# Patient Record
Sex: FEMALE | Race: WHITE | NOT HISPANIC OR LATINO | Employment: OTHER | URBAN - METROPOLITAN AREA
[De-identification: names, ages, dates, MRNs, and addresses within clinical notes are randomized per-mention and may not be internally consistent; named-entity substitution may affect disease eponyms.]

---

## 2017-04-14 ENCOUNTER — OFFICE VISIT - HEALTHEAST (OUTPATIENT)
Dept: FAMILY MEDICINE | Facility: CLINIC | Age: 62
End: 2017-04-14

## 2017-04-14 DIAGNOSIS — R82.90 CLOUDY URINE: ICD-10-CM

## 2017-04-14 DIAGNOSIS — M54.2 NECK PAIN: ICD-10-CM

## 2017-04-14 RX ORDER — IBUPROFEN 200 MG
800 TABLET ORAL 2 TIMES DAILY
Status: SHIPPED | COMMUNITY
Start: 2017-04-14 | End: 2023-04-18

## 2017-04-14 ASSESSMENT — MIFFLIN-ST. JEOR: SCORE: 1062.3

## 2017-04-15 ENCOUNTER — COMMUNICATION - HEALTHEAST (OUTPATIENT)
Dept: FAMILY MEDICINE | Facility: CLINIC | Age: 62
End: 2017-04-15

## 2017-07-25 ENCOUNTER — OFFICE VISIT - HEALTHEAST (OUTPATIENT)
Dept: FAMILY MEDICINE | Facility: CLINIC | Age: 62
End: 2017-07-25

## 2017-07-25 ENCOUNTER — COMMUNICATION - HEALTHEAST (OUTPATIENT)
Dept: SCHEDULING | Facility: CLINIC | Age: 62
End: 2017-07-25

## 2017-07-25 DIAGNOSIS — M54.50 LOW BACK PAIN: ICD-10-CM

## 2017-07-25 ASSESSMENT — MIFFLIN-ST. JEOR: SCORE: 1061.85

## 2017-09-25 ENCOUNTER — COMMUNICATION - HEALTHEAST (OUTPATIENT)
Dept: FAMILY MEDICINE | Facility: CLINIC | Age: 62
End: 2017-09-25

## 2018-03-01 ENCOUNTER — OFFICE VISIT - HEALTHEAST (OUTPATIENT)
Dept: FAMILY MEDICINE | Facility: CLINIC | Age: 63
End: 2018-03-01

## 2018-03-01 DIAGNOSIS — M54.50 LOW BACK PAIN: ICD-10-CM

## 2018-03-01 DIAGNOSIS — H93.19 TINNITUS: ICD-10-CM

## 2018-03-01 DIAGNOSIS — R68.84 JAW PAIN: ICD-10-CM

## 2018-03-01 ASSESSMENT — MIFFLIN-ST. JEOR: SCORE: 1084.98

## 2018-03-02 ENCOUNTER — RECORDS - HEALTHEAST (OUTPATIENT)
Dept: ADMINISTRATIVE | Facility: OTHER | Age: 63
End: 2018-03-02

## 2018-04-13 ENCOUNTER — OFFICE VISIT - HEALTHEAST (OUTPATIENT)
Dept: AUDIOLOGY | Facility: CLINIC | Age: 63
End: 2018-04-13

## 2018-04-13 ENCOUNTER — OFFICE VISIT - HEALTHEAST (OUTPATIENT)
Dept: OTOLARYNGOLOGY | Facility: CLINIC | Age: 63
End: 2018-04-13

## 2018-04-13 DIAGNOSIS — R42 DIZZINESS AND GIDDINESS: ICD-10-CM

## 2018-04-13 DIAGNOSIS — H93.13 TINNITUS OF BOTH EARS: ICD-10-CM

## 2018-04-13 RX ORDER — TRETINOIN 0.25 MG/G
GEL TOPICAL
Refills: 2 | Status: SHIPPED | COMMUNITY
Start: 2018-03-14

## 2018-04-13 RX ORDER — TRAMADOL HYDROCHLORIDE 50 MG/1
TABLET ORAL
Refills: 1 | Status: SHIPPED | COMMUNITY
Start: 2018-03-13

## 2018-04-13 RX ORDER — TIZANIDINE 2 MG/1
2 TABLET ORAL 2 TIMES DAILY
Refills: 1 | Status: SHIPPED | COMMUNITY
Start: 2018-04-05

## 2018-06-07 ENCOUNTER — OFFICE VISIT - HEALTHEAST (OUTPATIENT)
Dept: FAMILY MEDICINE | Facility: CLINIC | Age: 63
End: 2018-06-07

## 2018-06-07 DIAGNOSIS — M54.50 LOW BACK PAIN: ICD-10-CM

## 2018-06-07 DIAGNOSIS — I10 HYPERTENSION: ICD-10-CM

## 2018-06-07 LAB
ALBUMIN UR-MCNC: NEGATIVE MG/DL
ANION GAP SERPL CALCULATED.3IONS-SCNC: 11 MMOL/L (ref 5–18)
APPEARANCE UR: CLEAR
ATRIAL RATE - MUSE: 63 BPM
BILIRUB UR QL STRIP: NEGATIVE
BUN SERPL-MCNC: 16 MG/DL (ref 8–22)
CALCIUM SERPL-MCNC: 10.1 MG/DL (ref 8.5–10.5)
CHLORIDE BLD-SCNC: 103 MMOL/L (ref 98–107)
CO2 SERPL-SCNC: 28 MMOL/L (ref 22–31)
COLOR UR AUTO: YELLOW
CREAT SERPL-MCNC: 0.99 MG/DL (ref 0.6–1.1)
DIASTOLIC BLOOD PRESSURE - MUSE: NORMAL MMHG
ERYTHROCYTE [DISTWIDTH] IN BLOOD BY AUTOMATED COUNT: 11 % (ref 11–14.5)
GFR SERPL CREATININE-BSD FRML MDRD: 57 ML/MIN/1.73M2
GLUCOSE BLD-MCNC: 86 MG/DL (ref 70–125)
GLUCOSE UR STRIP-MCNC: NEGATIVE MG/DL
HCT VFR BLD AUTO: 41.8 % (ref 35–47)
HGB BLD-MCNC: 14 G/DL (ref 12–16)
HGB UR QL STRIP: NEGATIVE
INTERPRETATION ECG - MUSE: NORMAL
KETONES UR STRIP-MCNC: NEGATIVE MG/DL
LEUKOCYTE ESTERASE UR QL STRIP: NEGATIVE
MCH RBC QN AUTO: 32.2 PG (ref 27–34)
MCHC RBC AUTO-ENTMCNC: 33.5 G/DL (ref 32–36)
MCV RBC AUTO: 96 FL (ref 80–100)
NITRATE UR QL: NEGATIVE
P AXIS - MUSE: 59 DEGREES
PH UR STRIP: 7 [PH] (ref 5–8)
PLATELET # BLD AUTO: 202 THOU/UL (ref 140–440)
PMV BLD AUTO: 7.6 FL (ref 7–10)
POTASSIUM BLD-SCNC: 4.6 MMOL/L (ref 3.5–5)
PR INTERVAL - MUSE: 162 MS
QRS DURATION - MUSE: 86 MS
QT - MUSE: 408 MS
QTC - MUSE: 417 MS
R AXIS - MUSE: 14 DEGREES
RBC # BLD AUTO: 4.36 MILL/UL (ref 3.8–5.4)
SODIUM SERPL-SCNC: 142 MMOL/L (ref 136–145)
SP GR UR STRIP: 1.01 (ref 1–1.03)
SYSTOLIC BLOOD PRESSURE - MUSE: NORMAL MMHG
T AXIS - MUSE: 64 DEGREES
UROBILINOGEN UR STRIP-ACNC: NORMAL
VENTRICULAR RATE- MUSE: 63 BPM
WBC: 5.9 THOU/UL (ref 4–11)

## 2018-06-07 ASSESSMENT — MIFFLIN-ST. JEOR: SCORE: 1071.38

## 2018-07-05 ENCOUNTER — COMMUNICATION - HEALTHEAST (OUTPATIENT)
Dept: SCHEDULING | Facility: CLINIC | Age: 63
End: 2018-07-05

## 2018-07-31 ENCOUNTER — RECORDS - HEALTHEAST (OUTPATIENT)
Dept: ADMINISTRATIVE | Facility: OTHER | Age: 63
End: 2018-07-31

## 2018-08-06 ENCOUNTER — COMMUNICATION - HEALTHEAST (OUTPATIENT)
Dept: FAMILY MEDICINE | Facility: CLINIC | Age: 63
End: 2018-08-06

## 2018-08-07 ENCOUNTER — COMMUNICATION - HEALTHEAST (OUTPATIENT)
Dept: FAMILY MEDICINE | Facility: CLINIC | Age: 63
End: 2018-08-07

## 2018-08-07 DIAGNOSIS — I10 HTN (HYPERTENSION): ICD-10-CM

## 2018-10-16 ENCOUNTER — RECORDS - HEALTHEAST (OUTPATIENT)
Dept: ADMINISTRATIVE | Facility: OTHER | Age: 63
End: 2018-10-16

## 2018-11-28 ENCOUNTER — COMMUNICATION - HEALTHEAST (OUTPATIENT)
Dept: FAMILY MEDICINE | Facility: CLINIC | Age: 63
End: 2018-11-28

## 2018-11-28 DIAGNOSIS — Z12.31 VISIT FOR SCREENING MAMMOGRAM: ICD-10-CM

## 2018-11-28 DIAGNOSIS — Z12.11 COLON CANCER SCREENING: ICD-10-CM

## 2018-12-06 ENCOUNTER — RECORDS - HEALTHEAST (OUTPATIENT)
Dept: ADMINISTRATIVE | Facility: OTHER | Age: 63
End: 2018-12-06

## 2018-12-07 ENCOUNTER — OFFICE VISIT - HEALTHEAST (OUTPATIENT)
Dept: FAMILY MEDICINE | Facility: CLINIC | Age: 63
End: 2018-12-07

## 2018-12-07 ENCOUNTER — AMBULATORY - HEALTHEAST (OUTPATIENT)
Dept: SCHEDULING | Facility: CLINIC | Age: 63
End: 2018-12-07

## 2018-12-07 DIAGNOSIS — Z78.0 POST-MENOPAUSAL: ICD-10-CM

## 2018-12-07 DIAGNOSIS — M54.5 LOW BACK PAIN, UNSPECIFIED BACK PAIN LATERALITY, UNSPECIFIED CHRONICITY, WITH SCIATICA PRESENCE UNSPECIFIED: ICD-10-CM

## 2019-01-11 ENCOUNTER — RECORDS - HEALTHEAST (OUTPATIENT)
Dept: ADMINISTRATIVE | Facility: OTHER | Age: 64
End: 2019-01-11

## 2019-01-17 ASSESSMENT — MIFFLIN-ST. JEOR: SCORE: 1112.2

## 2019-01-21 ENCOUNTER — SURGERY - HEALTHEAST (OUTPATIENT)
Dept: SURGERY | Facility: AMBULATORY SURGERY CENTER | Age: 64
End: 2019-01-21

## 2019-02-11 ASSESSMENT — MIFFLIN-ST. JEOR: SCORE: 1094.06

## 2019-02-14 ENCOUNTER — SURGERY - HEALTHEAST (OUTPATIENT)
Dept: SURGERY | Facility: AMBULATORY SURGERY CENTER | Age: 64
End: 2019-02-14

## 2019-02-14 ASSESSMENT — MIFFLIN-ST. JEOR: SCORE: 1094.06

## 2019-06-12 ENCOUNTER — RECORDS - HEALTHEAST (OUTPATIENT)
Dept: ADMINISTRATIVE | Facility: OTHER | Age: 64
End: 2019-06-12

## 2019-08-08 ENCOUNTER — RECORDS - HEALTHEAST (OUTPATIENT)
Dept: ADMINISTRATIVE | Facility: OTHER | Age: 64
End: 2019-08-08

## 2019-09-10 ENCOUNTER — COMMUNICATION - HEALTHEAST (OUTPATIENT)
Dept: FAMILY MEDICINE | Facility: CLINIC | Age: 64
End: 2019-09-10

## 2019-11-19 ENCOUNTER — OFFICE VISIT - HEALTHEAST (OUTPATIENT)
Dept: FAMILY MEDICINE | Facility: CLINIC | Age: 64
End: 2019-11-19

## 2019-11-19 DIAGNOSIS — Z85.3 PERSONAL HISTORY OF MALIGNANT NEOPLASM OF BREAST: ICD-10-CM

## 2019-11-19 DIAGNOSIS — M54.50 CHRONIC LOW BACK PAIN, UNSPECIFIED BACK PAIN LATERALITY, UNSPECIFIED WHETHER SCIATICA PRESENT: ICD-10-CM

## 2019-11-19 DIAGNOSIS — Z00.00 ROUTINE GENERAL MEDICAL EXAMINATION AT A HEALTH CARE FACILITY: ICD-10-CM

## 2019-11-19 DIAGNOSIS — G89.29 CHRONIC LOW BACK PAIN, UNSPECIFIED BACK PAIN LATERALITY, UNSPECIFIED WHETHER SCIATICA PRESENT: ICD-10-CM

## 2019-11-19 DIAGNOSIS — M81.0 AGE-RELATED OSTEOPOROSIS WITHOUT CURRENT PATHOLOGICAL FRACTURE: ICD-10-CM

## 2019-11-19 DIAGNOSIS — I10 ESSENTIAL HYPERTENSION: ICD-10-CM

## 2019-11-19 DIAGNOSIS — M25.50 MULTIPLE JOINT PAIN: ICD-10-CM

## 2019-11-19 ASSESSMENT — MIFFLIN-ST. JEOR: SCORE: 1081.59

## 2019-11-20 LAB
HPV SOURCE: NORMAL
HUMAN PAPILLOMA VIRUS 16 DNA: NEGATIVE
HUMAN PAPILLOMA VIRUS 18 DNA: NEGATIVE
HUMAN PAPILLOMA VIRUS FINAL DIAGNOSIS: NORMAL
HUMAN PAPILLOMA VIRUS OTHER HR: NEGATIVE
SPECIMEN DESCRIPTION: NORMAL

## 2019-11-26 ENCOUNTER — AMBULATORY - HEALTHEAST (OUTPATIENT)
Dept: LAB | Facility: CLINIC | Age: 64
End: 2019-11-26

## 2019-11-26 DIAGNOSIS — Z00.00 ROUTINE GENERAL MEDICAL EXAMINATION AT A HEALTH CARE FACILITY: ICD-10-CM

## 2019-11-26 DIAGNOSIS — M25.50 MULTIPLE JOINT PAIN: ICD-10-CM

## 2019-11-26 DIAGNOSIS — M81.0 AGE-RELATED OSTEOPOROSIS WITHOUT CURRENT PATHOLOGICAL FRACTURE: ICD-10-CM

## 2019-11-26 DIAGNOSIS — I10 ESSENTIAL HYPERTENSION: ICD-10-CM

## 2019-11-26 LAB
ALBUMIN SERPL-MCNC: 4 G/DL (ref 3.5–5)
ALP SERPL-CCNC: 94 U/L (ref 45–120)
ALT SERPL W P-5'-P-CCNC: 22 U/L (ref 0–45)
ANION GAP SERPL CALCULATED.3IONS-SCNC: 12 MMOL/L (ref 5–18)
AST SERPL W P-5'-P-CCNC: 21 U/L (ref 0–40)
BILIRUB DIRECT SERPL-MCNC: 0.2 MG/DL
BILIRUB SERPL-MCNC: 0.6 MG/DL (ref 0–1)
BUN SERPL-MCNC: 12 MG/DL (ref 8–22)
C REACTIVE PROTEIN LHE: <0.1 MG/DL (ref 0–0.8)
CALCIUM SERPL-MCNC: 9.7 MG/DL (ref 8.5–10.5)
CHLORIDE BLD-SCNC: 102 MMOL/L (ref 98–107)
CHOLEST SERPL-MCNC: 229 MG/DL
CO2 SERPL-SCNC: 28 MMOL/L (ref 22–31)
CREAT SERPL-MCNC: 1.01 MG/DL (ref 0.6–1.1)
ERYTHROCYTE [DISTWIDTH] IN BLOOD BY AUTOMATED COUNT: 10.9 % (ref 11–14.5)
ERYTHROCYTE [SEDIMENTATION RATE] IN BLOOD BY WESTERGREN METHOD: 2 MM/HR (ref 0–20)
FASTING STATUS PATIENT QL REPORTED: YES
GFR SERPL CREATININE-BSD FRML MDRD: 56 ML/MIN/1.73M2
GLUCOSE BLD-MCNC: 74 MG/DL (ref 70–125)
HCT VFR BLD AUTO: 46.4 % (ref 35–47)
HDLC SERPL-MCNC: 72 MG/DL
HGB BLD-MCNC: 15.5 G/DL (ref 12–16)
LDLC SERPL CALC-MCNC: 120 MG/DL
MCH RBC QN AUTO: 33.2 PG (ref 27–34)
MCHC RBC AUTO-ENTMCNC: 33.4 G/DL (ref 32–36)
MCV RBC AUTO: 99 FL (ref 80–100)
PLATELET # BLD AUTO: 245 THOU/UL (ref 140–440)
PMV BLD AUTO: 8.5 FL (ref 7–10)
POTASSIUM BLD-SCNC: 4.1 MMOL/L (ref 3.5–5)
PROT SERPL-MCNC: 6.1 G/DL (ref 6–8)
RBC # BLD AUTO: 4.67 MILL/UL (ref 3.8–5.4)
RHEUMATOID FACT SERPL-ACNC: <15 IU/ML (ref 0–30)
SODIUM SERPL-SCNC: 142 MMOL/L (ref 136–145)
TRIGL SERPL-MCNC: 183 MG/DL
URATE SERPL-MCNC: 6.4 MG/DL (ref 2–7.5)
WBC: 5.2 THOU/UL (ref 4–11)

## 2019-11-27 LAB
25(OH)D3 SERPL-MCNC: 50.6 NG/ML (ref 30–80)
25(OH)D3 SERPL-MCNC: 50.6 NG/ML (ref 30–80)
BKR LAB AP ABNORMAL BLEEDING: NO
BKR LAB AP BIRTH CONTROL/HORMONES: NORMAL
BKR LAB AP CERVICAL APPEARANCE: NORMAL
BKR LAB AP GYN ADEQUACY: NORMAL
BKR LAB AP GYN INTERPRETATION: NORMAL
BKR LAB AP HPV REFLEX: NORMAL
BKR LAB AP LMP: NORMAL
BKR LAB AP PATIENT STATUS: NORMAL
BKR LAB AP PREVIOUS ABNORMAL: NO
BKR LAB AP PREVIOUS NORMAL: 2016
HIGH RISK?: NO
PATH REPORT.COMMENTS IMP SPEC: NORMAL
RESULT FLAG (HE HISTORICAL CONVERSION): NORMAL

## 2019-11-29 LAB — ANA SER QL: 0 U

## 2020-08-17 ENCOUNTER — COMMUNICATION - HEALTHEAST (OUTPATIENT)
Dept: FAMILY MEDICINE | Facility: CLINIC | Age: 65
End: 2020-08-17

## 2020-08-17 DIAGNOSIS — R19.7 DIARRHEA, UNSPECIFIED TYPE: ICD-10-CM

## 2020-08-21 ENCOUNTER — RECORDS - HEALTHEAST (OUTPATIENT)
Dept: ADMINISTRATIVE | Facility: OTHER | Age: 65
End: 2020-08-21

## 2020-11-18 ENCOUNTER — AMBULATORY - HEALTHEAST (OUTPATIENT)
Dept: SURGERY | Facility: AMBULATORY SURGERY CENTER | Age: 65
End: 2020-11-18

## 2020-11-18 ENCOUNTER — AMBULATORY - HEALTHEAST (OUTPATIENT)
Dept: SCHEDULING | Facility: CLINIC | Age: 65
End: 2020-11-18

## 2020-11-18 DIAGNOSIS — Z11.59 ENCOUNTER FOR SCREENING FOR OTHER VIRAL DISEASES: ICD-10-CM

## 2020-11-19 ENCOUNTER — RECORDS - HEALTHEAST (OUTPATIENT)
Dept: ADMINISTRATIVE | Facility: OTHER | Age: 65
End: 2020-11-19

## 2020-12-06 ENCOUNTER — AMBULATORY - HEALTHEAST (OUTPATIENT)
Dept: FAMILY MEDICINE | Facility: CLINIC | Age: 65
End: 2020-12-06

## 2020-12-06 DIAGNOSIS — Z11.59 ENCOUNTER FOR SCREENING FOR OTHER VIRAL DISEASES: ICD-10-CM

## 2020-12-07 LAB
SARS-COV-2 PCR COMMENT: NORMAL
SARS-COV-2 RNA SPEC QL NAA+PROBE: NEGATIVE
SARS-COV-2 VIRUS SPECIMEN SOURCE: NORMAL

## 2020-12-08 ENCOUNTER — COMMUNICATION - HEALTHEAST (OUTPATIENT)
Dept: SCHEDULING | Facility: CLINIC | Age: 65
End: 2020-12-08

## 2020-12-09 ASSESSMENT — MIFFLIN-ST. JEOR: SCORE: 1040.77

## 2020-12-10 ENCOUNTER — SURGERY - HEALTHEAST (OUTPATIENT)
Dept: SURGERY | Facility: AMBULATORY SURGERY CENTER | Age: 65
End: 2020-12-10

## 2020-12-10 ASSESSMENT — MIFFLIN-ST. JEOR: SCORE: 1048.7

## 2021-02-11 ENCOUNTER — RECORDS - HEALTHEAST (OUTPATIENT)
Dept: ADMINISTRATIVE | Facility: OTHER | Age: 66
End: 2021-02-11

## 2021-04-12 ENCOUNTER — IMMUNIZATION (OUTPATIENT)
Dept: NURSING | Facility: CLINIC | Age: 66
End: 2021-04-12
Payer: COMMERCIAL

## 2021-04-12 PROCEDURE — 91300 PR COVID VAC PFIZER DIL RECON 30 MCG/0.3 ML IM: CPT

## 2021-04-12 PROCEDURE — 0001A PR COVID VAC PFIZER DIL RECON 30 MCG/0.3 ML IM: CPT

## 2021-05-03 ENCOUNTER — IMMUNIZATION (OUTPATIENT)
Dept: NURSING | Facility: CLINIC | Age: 66
End: 2021-05-03
Attending: FAMILY MEDICINE
Payer: COMMERCIAL

## 2021-05-03 PROCEDURE — 0002A PR COVID VAC PFIZER DIL RECON 30 MCG/0.3 ML IM: CPT

## 2021-05-03 PROCEDURE — 91300 PR COVID VAC PFIZER DIL RECON 30 MCG/0.3 ML IM: CPT

## 2021-05-05 ENCOUNTER — RECORDS - HEALTHEAST (OUTPATIENT)
Dept: ADMINISTRATIVE | Facility: OTHER | Age: 66
End: 2021-05-05

## 2021-05-30 ENCOUNTER — HEALTH MAINTENANCE LETTER (OUTPATIENT)
Age: 66
End: 2021-05-30

## 2021-05-30 VITALS — HEIGHT: 64 IN | WEIGHT: 118 LBS | BODY MASS INDEX: 20.14 KG/M2

## 2021-05-31 VITALS — BODY MASS INDEX: 20.13 KG/M2 | HEIGHT: 64 IN | WEIGHT: 117.9 LBS

## 2021-06-01 VITALS — WEIGHT: 123 LBS | HEIGHT: 64 IN | BODY MASS INDEX: 21 KG/M2

## 2021-06-01 VITALS — BODY MASS INDEX: 20.49 KG/M2 | HEIGHT: 64 IN | WEIGHT: 120 LBS

## 2021-06-02 VITALS — WEIGHT: 129 LBS | BODY MASS INDEX: 22.49 KG/M2

## 2021-06-02 VITALS — WEIGHT: 129 LBS | HEIGHT: 64 IN | BODY MASS INDEX: 22.02 KG/M2

## 2021-06-02 VITALS — BODY MASS INDEX: 21.34 KG/M2 | HEIGHT: 64 IN | WEIGHT: 125 LBS

## 2021-06-03 NOTE — PATIENT INSTRUCTIONS - HE
Remain physically active.  Weightbearing exercise such as light weights or resistance bands can be helpful for your bone density  Recommend that you consider seeing an endocrinologist to discuss options for your bone density  Remember adequate calcium 1200 mg plus vitamin D 1000 units  Your Pap test was done today  You may make a lab only appointment  Follow-up with rheumatology

## 2021-06-03 NOTE — PROGRESS NOTES
Assessment/ Plan     1. Routine general medical examination at a health care facility    Recommend remaining physically active as tolerated  Recommend adequate calcium and vitamin D supplementation  Check a lipid cascade  ThinPrep Pap test will be sent  - Lipid Cascade; Future  - Gynecologic Cytology (PAP Smear)  - HPV High Risk DNA Cervical    Her colonoscopy is up-to-date from April 2011.  She is due in April 2021  Influenza vaccine was given in October recommend that she consider the tetanus booster      2. Personal history of malignant neoplasm of breast  Status post left breast mastectomy    Release of information was obtained.  We will obtain her most recent mammogram which was reportedly normal from October, 2019    3. Essential hypertension  Inadequate control    Reviewed dietary and lifestyle modification  Patient preference is to recheck her blood pressure  Strongly recommend an antihypertensive medication if her blood pressure remains elevated  - Basic Metabolic Panel; Future    4. Chronic low back pain, unspecified back pain laterality, unspecified whether sciatica present    Continue to follow plan of Dr. Sipple  She follows up every 3 months  She has been treated with tizanidine and tramadol    5. Age-related osteoporosis without current pathological fracture    Recommend adequate calcium and vitamin D  Recommend weightbearing exercise  Recommend follow-up with endocrinology to review treatment options  - Hepatic Profile; Future  - HM2(CBC w/o Differential); Future  - Vitamin D, Total (25-Hydroxy); Future    6. Multiple joint pain  May be stated osteoarthritis    Check laboratory testing as noted  Patient will follow-up with rheumatology per her request  - Rheumatoid Factor Quant; Future  - Antinuclear Antibody (SIENA) Cascade; Future  - Erythrocyte Sedimentation Rate; Future  - Uric Acid; Future  - Ambulatory referral to Rheumatology  - C-Reactive Protein (CRP); Future      Subjective:       Jenifer LOPEZ  Aelx is a 64 y.o. female who presents to the clinic for complete physical examination.  Her medical history is notable for hypertension, breast cancer, chronic low back pain, and osteoporosis.    Her medical history is notable for breast cancer diagnosed in 2007.  She underwent a left breast mastectomy.  Her most recent mammogram was in October of this year.    Additionally, she has a history of chronic neck and low back pain followed by Dr. Sipple.  She has known low back pain and multilevel lumbar degenerative changes.  There is evidence of spondylolisthesis and advanced facet degeneration moderate disc degeneration.  She has paracentral disc protrusions noted including L4-L5.  She has been treated with gabapentin and tizanidine as well as tramadol.  She continues to follow-up with Dr. Sipple every 3 months.    Her most recent colonoscopy was in April 2011.  She is due in 2021.    Review of systems is notable for pain in multiple joints.  She has had pain in joints of her hands.  She has had neck and back pain as noted.  She denies prolonged early morning stiffness.  She has not had obvious swelling in her joints.    Also, she has a history of osteoporosis.  She has previously been treated with alendronate as well as Forteo.  She is taking calcium and vitamin D currently.    The following portions of the patient's history were reviewed and updated as appropriate: allergies, current medications, past family history, past medical history, past social history, past surgical history and problem list. Medications have been reconciled    Review of Systems   A 12 point comprehensive review of systems was negative except as noted.      Current Outpatient Medications   Medication Sig Dispense Refill     BIOTIN ORAL Take by mouth daily.       calcium carb and citrate-vitD3 600 mg calcium- 500 unit TbER Take by mouth daily.       cholecalciferol, vitamin D3, 1,000 unit tablet Take 1,000 Units by mouth daily.       COQ10,  "UBIQUINOL, ORAL Take by mouth.       ibuprofen (ADVIL,MOTRIN) 200 MG tablet Take 800 mg by mouth 2 (two) times a day.       multivitamin (ONE A DAY) per tablet Take 1 tablet by mouth daily.       NON FORMULARY CARDITONE  1 and half capsules daily .       tiZANidine (ZANAFLEX) 2 MG tablet Take 2 mg by mouth 2 (two) times a day.  1     TIZANIDINE HCL (TIZANIDINE ORAL) as needed.       traMADol (ULTRAM) 50 mg tablet TK 1 TO 2 TS PO Q 4 H PRN  1     tretinoin (RETIN-A) 0.025 % gel YENI TO FACE QHS  2     No current facility-administered medications for this visit.        Objective:      /72   Pulse 64   Temp 98.6  F (37  C) (Oral)   Resp 12   Ht 5' 3\" (1.6 m)   Wt 124 lb (56.2 kg)   BMI 21.97 kg/m        General appearance: alert, appears stated age and cooperative  Head: Normocephalic, without obvious abnormality, atraumatic  Eyes: conjunctivae/corneas clear. PERRL, EOM's intact.   Ears: normal TM's and external ear canals both ears  Nose: Nares normal. Septum midline. Mucosa normal. No drainage or sinus tenderness.  Throat: lips, mucosa, and tongue normal; teeth and gums normal  Neck: no adenopathy, supple, symmetrical, trachea midline and thyroid not enlarged, symmetric, no tenderness/mass/nodules  Back: symmetric, no curvature. ROM normal. No CVA tenderness.  Lungs: clear to auscultation bilaterally  Heart: regular rate and rhythm, S1, S2 normal, no murmur, click, rub or gallop  Abdomen: soft, non-tender; bowel sounds normal; no masses,  no organomegaly  Normally developed genitalia with no external lesions or eruptions. Vagina and cervix show no lesions, inflammation, discharge or tenderness.   Uterus normal to palpation.  No adnexal mass or tenderness.  Extremities: extremities normal, atraumatic, no cyanosis or edema  Skin: Skin color, texture, turgor normal  Lymph nodes: Cervical nodes normal.  Neurologic: Alert and oriented X 3         Recent Results (from the past 168 hour(s))   Basic Metabolic " Panel   Result Value Ref Range    Sodium 142 136 - 145 mmol/L    Potassium 4.1 3.5 - 5.0 mmol/L    Chloride 102 98 - 107 mmol/L    CO2 28 22 - 31 mmol/L    Anion Gap, Calculation 12 5 - 18 mmol/L    Glucose 74 70 - 125 mg/dL    Calcium 9.7 8.5 - 10.5 mg/dL    BUN 12 8 - 22 mg/dL    Creatinine 1.01 0.60 - 1.10 mg/dL    GFR MDRD Af Amer >60 >60 mL/min/1.73m2    GFR MDRD Non Af Amer 56 (L) >60 mL/min/1.73m2   Hepatic Profile   Result Value Ref Range    Bilirubin, Total 0.6 0.0 - 1.0 mg/dL    Bilirubin, Direct 0.2 <=0.5 mg/dL    Protein, Total 6.1 6.0 - 8.0 g/dL    Albumin 4.0 3.5 - 5.0 g/dL    Alkaline Phosphatase 94 45 - 120 U/L    AST 21 0 - 40 U/L    ALT 22 0 - 45 U/L   HM2(CBC w/o Differential)   Result Value Ref Range    WBC 5.2 4.0 - 11.0 thou/uL    RBC 4.67 3.80 - 5.40 mill/uL    Hemoglobin 15.5 12.0 - 16.0 g/dL    Hematocrit 46.4 35.0 - 47.0 %    MCV 99 80 - 100 fL    MCH 33.2 27.0 - 34.0 pg    MCHC 33.4 32.0 - 36.0 g/dL    RDW 10.9 (L) 11.0 - 14.5 %    Platelets 245 140 - 440 thou/uL    MPV 8.5 7.0 - 10.0 fL   Lipid Cascade   Result Value Ref Range    Cholesterol 229 (H) <=199 mg/dL    Triglycerides 183 (H) <=149 mg/dL    HDL Cholesterol 72 >=50 mg/dL    LDL Calculated 120 <=129 mg/dL    Patient Fasting > 8hrs? Yes    Vitamin D, Total (25-Hydroxy)   Result Value Ref Range    Vitamin D, Total (25-Hydroxy) 50.6 30.0 - 80.0 ng/mL   Rheumatoid Factor Quant   Result Value Ref Range    Rheumatoid Factor Quantitative <15.0 0 - 30 IU/mL   Antinuclear Antibody (SIENA) Cascade   Result Value Ref Range    SIENA Screen Cascade 0.0 <=2.9 U   Erythrocyte Sedimentation Rate   Result Value Ref Range    Sed Rate 2 0 - 20 mm/hr   Uric Acid   Result Value Ref Range    Uric Acid 6.4 2.0 - 7.5 mg/dL   C-Reactive Protein (CRP)   Result Value Ref Range    CRP <0.1 0.0 - 0.8 mg/dL          This note has been dictated using voice recognition software. Any grammatical or context distortions are unintentional and inherent to the software

## 2021-06-04 VITALS
TEMPERATURE: 98.6 F | HEART RATE: 64 BPM | DIASTOLIC BLOOD PRESSURE: 72 MMHG | RESPIRATION RATE: 12 BRPM | WEIGHT: 124 LBS | SYSTOLIC BLOOD PRESSURE: 148 MMHG | BODY MASS INDEX: 21.97 KG/M2 | HEIGHT: 63 IN

## 2021-06-05 VITALS — BODY MASS INDEX: 19.63 KG/M2 | WEIGHT: 115 LBS | HEIGHT: 64 IN

## 2021-06-10 NOTE — PROGRESS NOTES
SUBJECTIVE:    This is a 62-year-old female who presents to the clinic to establish care.  Her primary concern is that she has experienced some urinary changes recently.  She has noted over the past 6 weeks that her urine has been cloudy.  She denies obvious passage of blood but has noticed some flecks in her urine.  There is been no strong odor.  She has not had a fever, flank pain, or significant abdominal pain.  She does correlate this with starting tramadol.  Her medical history is notable primarily for chronic neck and low back pain.  She does follow up with Dr. Sipple for care of her spine.  Essentially, she has evidence of multilevel degenerative disc disease which is severe at C4-5, C5-6, and C6-7, as well as C7-T1 levels.  She has copies of her MRI reports.  She also has had low back pain and has multilevel lumbar degenerative changes.  There is evidence of spondylolisthesis and advanced facet degeneration and moderate disc degeneration.  She has paracentral disc protrusions noted including L4-5.  She has been taking tramadol 50 mg up to 4 in the day.  She will take gabapentin as needed and also will take tizanidine as needed.  She has chronic pain in the neck and there is some radiation to her upper extremities as well as lower extremities.  She has had some weakness as well.  Medical history is otherwise notable for breast cancer with previous left breast mastectomy.  Family history is notable for a mother with hypertension and father with high blood pressure.  Social history is notable for the fact that she is a former smoker.  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  She is  and has 1 stepchild.  Review of systems is notable for occasional gastrointestinal changes.      There is no problem list on file for this patient.      No current outpatient prescriptions on file prior to visit.     No current facility-administered medications on file prior to visit.        Allergies   Allergen Reactions      Alendronate Sodium Nausea Only     Abdominal pain and body pain in the muscles and joints.     Citalopram Unknown and Nausea Only     Abnormal vivid dreams.     Duloxetine Unknown, Dizziness and Nausea Only            A 12 point comprehensive review of systems was negative except as noted.      OBJECTIVE:     Vitals:    04/14/17 1756   BP: 142/82   Pulse:    Resp:    Temp:        General: Alert, not acutely distressed   Head:  Atraumatic, normocephalic  Cardiovascular: S1-S2 with regular rate and without murmur, rub, or gallop   Lungs:  Clear to auscultation bilaterally   Extremities: Without cyanosis or edema   Neuro:  CN II-XII appear intact        Laboratory Results:    Results for orders placed or performed in visit on 04/14/17   Urinalysis-UC if Indicated   Result Value Ref Range    Color, UA Yellow Colorless, Yellow, Straw, Light Yellow    Clarity, UA Cloudy (!) Clear    Glucose, UA Negative Negative    Bilirubin, UA Negative Negative    Ketones, UA Negative Negative    Specific Gravity, UA 1.015 1.005 - 1.030    Blood, UA Negative Negative    pH, UA 8.5 (H) 5.0 - 8.0    Protein, UA Negative Negative mg/dL    Urobilinogen, UA 0.2 E.U./dL 0.2 E.U./dL, 1.0 E.U./dL    Nitrite, UA Negative Negative    Leukocytes, UA Negative Negative    Bacteria, UA None Seen None Seen hpf    RBC, UA 0-2 None Seen, 0-2 hpf    WBC, UA 0-5 None Seen, 0-5 hpf    Squam Epithel, UA 0-5 None Seen, 0-5 lpf    Amorphous, UA Many (!) None Seen         ASSESSMENT AND PLAN:    1. Cloudy urine  Etiology unclear.  She is taking tramadol which can contribute to urinary changes    Urinalysis results reviewed  There is no clear infection or hematuria at this point  Recommend checking a basic metabolic panel  Recommend also that she follow-up with Dr. Sipple to discuss possible discontinuation of tramadol to see if symptoms resolve  She admits that she takes a lot of nonsteroidal anti-inflammatory medication and that may need to be limited  If  having ongoing issues consider imaging such as a renal ultrasound or possibly CT scan for further evaluation    - Urinalysis-UC if Indicated  - Basic Metabolic Panel    2. Neck pain    Recommend that she continue to follow-up with Dr. Sipple for ongoing treatment    3. Elevated blood pressure    Recheck blood pressure is lower but still elevated  Patient reports being nervous  Recommend frequent blood pressure checks.  Follow-up in the clinic to recheck    She agrees to the above plan will follow-up as needed      George Brantley MD      This note has been dictated and transcribed using voice recognition software.  Any grammatical or contextual distortions are unintentional and inherent to the software.

## 2021-06-10 NOTE — TELEPHONE ENCOUNTER
JM- Please enter a referral to [facility] and I will call pt with the number to set this appt up. Thank you.

## 2021-06-10 NOTE — TELEPHONE ENCOUNTER
I entered a referral for gastroenterology.  I believe that the request is for consultation given her history of diarrhea.  Please clarify and let me know if there is a different request. Thank you.

## 2021-06-12 NOTE — PROGRESS NOTES
Assessment/ Plan     1. Low back pain  Acute on chronic pain with recent injury  History of lumbar disc protrusion and degenerative disc disease    Recommend that she continue gabapentin  She may apply heat or cool packs  Recommend gentle stretches and exercises  Continue gabapentin  She may take Vicodin for breakthrough pain.  Discussed potential risks including sedation and constipation.  Discussed this could be habit-forming  She will need to be seen prior to any refills.  She was given a prescription for diazepam as a muscle relaxant.  Reviewed side effects and discussed this is a short-term medication only  Recommend follow-up with Dr. Sipple  She may need physical therapy  Patient agrees to plan    25 minutes were spent with the patient and greater than 50% of the time was spent in face to face counseling and coordination of care        Subjective:       Jenifer Johnson is a 62 y.o. female who presents to the clinic after injuring her low back.  She has a history of chronic low back pain followed by Dr. Sipple [facility].  She states that she was squatting yesterday and bent forward.  When she stood up she felt instant pain in the low back area.  This radiates across her back.  She has had some pain in the area of the shoulder blade which radiates to her left arm as well.  Movement can make this worse.  She states that her activity has been significantly limited.  She has a difficult time lifting her feet.  She has felt immobilized.    Her medical history is notable primarily for chronic neck and low back pain.  She does follow up with Dr. Sipple for care of her spine.  Essentially, she has evidence of multilevel degenerative disc disease which is severe at C4-5, C5-6, and C6-7, as well as C7-T1 levels.  She has copies of her MRI reports.  She also has had low back pain and has multilevel lumbar degenerative changes.  There is evidence of spondylolisthesis and advanced facet degeneration and  moderate disc degeneration.  She has paracentral disc protrusions noted including L4-5.  She has been taking tramadol 50 mg up to 4 in the day.  She will take gabapentin as needed and also will take tizanidine as needed.  She has chronic pain in the neck and there is some radiation to her upper extremities as well as lower extremities.  She has had some weakness as well.    She continues to have some back spasms since her injury.  Typically, she has required muscle relaxants which have been helpful.  The pain is fairly severe at this time.  She denies focal weakness.  Dr. Sipple was not available today for an appointment.    The following portions of the patient's history were reviewed and updated as appropriate: allergies, current medications, past family history, past medical history, past social history, past surgical history and problem list.    Review of Systems   A 12 point comprehensive review of systems was negative except as noted.      Current Outpatient Prescriptions   Medication Sig Dispense Refill     BIOTIN ORAL Take by mouth daily.       calcium carb and citrate-vitD3 600 mg calcium- 500 unit TbER Take by mouth daily.       cholecalciferol, vitamin D3, 1,000 unit tablet Take 1,000 Units by mouth daily.       GABAPENTIN ORAL as needed.       ibuprofen (ADVIL,MOTRIN) 200 MG tablet Take 800 mg by mouth 2 (two) times a day.       multivitamin (ONE A DAY) per tablet Take 1 tablet by mouth daily.       TIZANIDINE HCL (TIZANIDINE ORAL) as needed.       traMADol (ULTRAM-ER) 200 MG 24 hr tablet Take 200 mg by mouth daily.       diazePAM (VALIUM) 5 MG tablet Take one PO Q 12 hours prn back spasms 15 tablet 0     HYDROcodone-acetaminophen 5-325 mg per tablet Take 1-2 tablets by mouth every 6 (six) hours as needed for pain. 30 tablet 0     scopolamine (TRANSDERM-SCOP) 1.5 mg (1 mg over 3 days) transdermal patch Place 1 patch on the skin every third day. 4 patch 0     No current facility-administered medications  "for this visit.        Objective:      /90  Pulse 76  Temp 98.3  F (36.8  C) (Oral)   Resp 16  Ht 5' 3.5\" (1.613 m)  Wt 117 lb 14.4 oz (53.5 kg)  BMI 20.56 kg/m2      General appearance: alert, appears stated age and cooperative  Head: Normocephalic, without obvious abnormality, atraumatic  Eyes: conjunctivae/corneas clear. PERRL, EOM's intact  Nose: Nares normal. Septum midline. Mucosa normal. No drainage or sinus tenderness.  Throat: lips, mucosa, and tongue normal; teeth and gums normal  Neck: no adenopathy, no carotid bruit, no JVD, supple, symmetrical, trachea midline and thyroid not enlarged, symmetric, no tenderness/mass/nodules  Back: There is no tenderness over the lumbar spine  She has significant paraspinous muscle tenderness lateral to lumbar spine bilaterally  Straight leg raise is equivocal on the right  She is tender to palpation lateral to the thoracic spine and left near the left scapula  Lungs: clear to auscultation bilaterally  Heart: regular rate and rhythm, S1, S2 normal, no murmur, click, rub or gallop  Abdomen: soft, non-tender; bowel sounds normal; no masses,  no organomegaly  Extremities: extremities normal, atraumatic, no cyanosis or edema  Lymph nodes: Cervical, supraclavicular, and axillary nodes normal.  Neurologic: Alert and oriented X 3, normal strength and tone. Normal coordination and gait         No results found for this or any previous visit (from the past 168 hour(s)).       This note has been dictated using voice recognition software. Any grammatical or context distortions are unintentional and inherent to the software  "

## 2021-06-16 PROBLEM — M54.50 LOW BACK PAIN: Status: ACTIVE | Noted: 2018-06-07

## 2021-06-16 PROBLEM — M81.0 AGE-RELATED OSTEOPOROSIS WITHOUT CURRENT PATHOLOGICAL FRACTURE: Status: ACTIVE | Noted: 2019-11-19

## 2021-06-16 PROBLEM — Z85.3 PERSONAL HISTORY OF MALIGNANT NEOPLASM OF BREAST: Status: ACTIVE | Noted: 2019-11-19

## 2021-06-16 PROBLEM — I10 HTN (HYPERTENSION): Status: ACTIVE | Noted: 2018-08-07

## 2021-06-16 NOTE — PROGRESS NOTES
Assessment/ Plan     1. Tinnitus    Recommend follow-up for audiology evaluation first to evaluate for a hearing loss  Following audiology evaluation will recommend follow-up with ENT  - Ambulatory referral to Audiology  - Ambulatory referral to ENT    2. Jaw pain  Consider possible TMJ dysfunction    She is following up with audiology and ENT as noted  Consider referral to a facial pain clinic    3. Low back pain    Continue to follow recommendations of the pain specialist  Regarding chronic pain favor follow-up with her pain clinic  In the meantime she will be treated with ibuprofen and tizanidine    25 minutes were spent with the patient and greater than 50% of the time was spent in face to face counseling and coordination of care        Subjective:       Jenifer Johnson is a 62 y.o. female who presents to the clinic in follow-up.  Recent concern has been that she has been experiencing ringing in her ears.  This is been going on for many months.  She cannot think of any specific injury or trauma to her ears.  She also has had pain in the left jaw area.  It hurts to bite.  She would like to consider further evaluation.    As noted at a previous visit she has a history of chronic neck and low back pain followed by Dr. Sipple at the [facility]. Essentially, she has evidence of multilevel degenerative disc disease which is severe at C4-5, C5-6, and C6-7, as well as C7-T1 levels.  She has copies of her MRI reports.  She also has had low back pain and has multilevel lumbar degenerative changes.  There is evidence of spondylolisthesis and advanced facet degeneration and moderate disc degeneration.  She has paracentral disc protrusions noted including L4-5.  She has been taking tramadol 50 mg up to 4 in the day.  She will take gabapentin as needed and also will take tizanidine as needed.  She has chronic pain in the neck and there is some radiation to her upper extremities as well as lower extremities.  She  has had some weakness as well.     She has a copy of her pain care plan from the [facility].  She will be having an MRI of the lumbar spine and cervical spine as evaluation and will have an epidurography.  Her spine specialist recommended that she have a consult for ringing in ears, left jaw pain, and hearing loss.  Discussed independent stretching, strengthening, and a conditioning program.  They also recommended that she see her dentist for a bite splint evaluation.  She continues to take ibuprofen as well as tizanidine.  She has been treated with Ativan.  Her history is a bit vague but she notes that she has taken tramadol as well as Vicodin at times for her pain.    The following portions of the patient's history were reviewed and updated as appropriate: allergies, current medications, past family history, past medical history, past social history, past surgical history and problem list.    Review of Systems   A 12 point comprehensive review of systems was negative except as noted.      Current Outpatient Prescriptions   Medication Sig Dispense Refill     BIOTIN ORAL Take by mouth daily.       calcium carb and citrate-vitD3 600 mg calcium- 500 unit TbER Take by mouth daily.       cholecalciferol, vitamin D3, 1,000 unit tablet Take 1,000 Units by mouth daily.       ibuprofen (ADVIL,MOTRIN) 200 MG tablet Take 800 mg by mouth 2 (two) times a day.       multivitamin (ONE A DAY) per tablet Take 1 tablet by mouth daily.       TIZANIDINE HCL (TIZANIDINE ORAL) as needed.       traMADol (ULTRAM-ER) 200 MG 24 hr tablet Take 200 mg by mouth daily.       diazePAM (VALIUM) 5 MG tablet Take one PO Q 12 hours prn back spasms 15 tablet 0     GABAPENTIN ORAL as needed.       HYDROcodone-acetaminophen 5-325 mg per tablet Take 1-2 tablets by mouth every 6 (six) hours as needed for pain. 30 tablet 0     scopolamine (TRANSDERM-SCOP) 1.5 mg (1 mg over 3 days) transdermal patch Place 1 patch on the skin every third  "day. 4 patch 0     No current facility-administered medications for this visit.        Objective:      BP (!) 148/100  Pulse 88  Temp 98.4  F (36.9  C) (Oral)   Resp 12  Ht 5' 3.5\" (1.613 m)  Wt 123 lb (55.8 kg)  BMI 21.45 kg/m2      General appearance: alert, appears stated age and cooperative  Head: Normocephalic, without obvious abnormality, atraumatic  There is some discomfort in the left TMJ area.  There is no obvious crepitus  Eyes: conjunctivae/corneas clear. PERRL, EOM's intact.   Ears: normal TM's and external ear canals both ears  Nose: Nares normal. Septum midline. Mucosa normal. No drainage or sinus tenderness.  Throat: lips, mucosa, and tongue normal; teeth and gums normal  Neck: no adenopathy, symmetrical, trachea midline   Lungs: clear to auscultation bilaterally  Heart: regular rate and rhythm, S1, S2 normal, no murmur, click, rub or gallop  Abdomen: soft, non-tender; bowel sounds normal; no masses,  no organomegaly  Extremities: extremities normal, atraumatic, no cyanosis or edema  Skin: Skin color, texture, turgor normal. No rashes or lesions  Lymph nodes: Cervical nodes normal.  Neurologic: Alert and oriented X 3. Normal coordination and gait         No results found for this or any previous visit (from the past 168 hour(s)).       This note has been dictated using voice recognition software. Any grammatical or context distortions are unintentional and inherent to the software  "

## 2021-06-17 NOTE — PROGRESS NOTES
Audiology Report    Referring Provider:   Service    History:  Jenifer Johnson is seen in conjunction with ENT appointment today. She has a history of constant tinnitus bilaterally for the past six months. She reports it seems louder in her left ear than right ear. She denies hearing concerns, but reports she hasn't had her hearing evaluated before. She reports some dizziness when walking or when looking up and to the right. This occurred most recently in March, though she has had more severe episodes of vertigo in years past. 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     Results:     Left Ear Right Ear   Otoscopy clear canals clear canals   Pure Tone Audiometry normal hearing   normal hearing   Word Recognition excellent excellent   Tympanometry normal (Type A)  normal (Type A)     Transducer: Insert earphones and Circumaural headphones    Reliability was good  and there was good  SRT to PTA agreement.       Plan:  The patient is returned to ENT for follow up.  She should return for retesting with ENT recommendation.      Please see audiogram under  media  and  audiogram  in the patient s chart.     Gwen Dye, CCC-A  [place] Licensed Audiologist #[id number]

## 2021-06-17 NOTE — PROGRESS NOTES
Jenifer Johnson is a 63 y.o. female seen in consultation at the request of Dr. Brantley for tinnitus.  Has had tinnitus in both ears for two years but significantly worse, left greater than right for the last 6 months.  Denies otologic history of infections or surgeries. She notes issues with her neck and TMJ.  She notes a history of vertigo in the past       ALLERGY:    Allergies   Allergen Reactions     Alendronate Sodium Nausea Only     Abdominal pain and body pain in the muscles and joints.     Citalopram Unknown and Nausea Only     Abnormal vivid dreams.     Duloxetine Unknown, Dizziness and Nausea Only     Forteo [Teriparatide] Nausea Only       MEDICATIONS:     Current Outpatient Prescriptions on File Prior to Visit   Medication Sig Dispense Refill     BIOTIN ORAL Take by mouth daily.       calcium carb and citrate-vitD3 600 mg calcium- 500 unit TbER Take by mouth daily.       cholecalciferol, vitamin D3, 1,000 unit tablet Take 1,000 Units by mouth daily.       diazePAM (VALIUM) 5 MG tablet Take one PO Q 12 hours prn back spasms 15 tablet 0     GABAPENTIN ORAL as needed.       HYDROcodone-acetaminophen 5-325 mg per tablet Take 1-2 tablets by mouth every 6 (six) hours as needed for pain. 30 tablet 0     ibuprofen (ADVIL,MOTRIN) 200 MG tablet Take 800 mg by mouth 2 (two) times a day.       multivitamin (ONE A DAY) per tablet Take 1 tablet by mouth daily.       scopolamine (TRANSDERM-SCOP) 1.5 mg (1 mg over 3 days) transdermal patch Place 1 patch on the skin every third day. 4 patch 0     TIZANIDINE HCL (TIZANIDINE ORAL) as needed.       traMADol (ULTRAM-ER) 200 MG 24 hr tablet Take 200 mg by mouth daily.       No current facility-administered medications on file prior to visit.        Past Medical/Surgical History, Family History and Social History reviewed in detail and documented separately in the medical record.    Complete Review of Systems:  A 10-point review was performed.  Pertinent positives are  noted in the HPI and on a separate scanned document in the chart.    EXAM:  There were no vitals filed for this visit.    Nurse documentation reviewed  and documented separately.    General Appearance: Pleasant, alert, appropriate appearance for age. No acute distress    Head Exam: Normal. Normocephalic, atraumatic.    Eye Exam: Normal external eye, conjunctiva, lids, cornea. Extra-ocular movements are intact.    Left external ear: normal  Left otoscopic exam: Normal EAC. Normal TM     Right external ear: normal  Right otoscopic exam: Normal EAC. Normal TM    Nose Exam: Normal external nose. Septum midline. Nasal mucosa normal.  Inferior turbinates normal.    OroPharynx Exam: Dental hygiene adequate. Normal tongue. Normal buccal mucosa. Normal palate.  Normal pharynx. Normal tonsils.    Neck Exam: Supple, no masses or nodes. Trachea and larynx midline.    Thyroid Exam: No tenderness, nodules or enlargement.    Salivary Glands: nontender without masses    Neuro: Alert and oriented times 3, CN 2-12 grossly intact, no nystagmus, PERRL, EOMI, normal speech and gait    Chest/Respiratory Exam: Normal chest wall motion and respiratory effort. No audible stridor or wheezing.    Cardiovascular Exam: Regular rate and rhythm.  No cyanosis, clubbing or edema.    Pulses: carotid pulses normal    ASSESSMENT:  1. Tinnitus of both ears        PLAN: Findings, assessment, and management options were discussed.  Discussed pathophysiology, masking techniques and triggers for tinnitus.  We discussed current guidelines in regards to approach to tinnitus.  Unfortunately there are not many satisfying answers but working on triggers may be helpful.

## 2021-06-18 NOTE — PROGRESS NOTES
Assessment/ Plan     1. Hypertension, newly diagnosed    An ECG reveals normal sinus rhythm without acute changes.  This was personally reviewed and will be reviewed by cardiology    Check laboratory testing as noted    - Basic Metabolic Panel  - HM2(CBC w/o Differential)  - Urinalysis-UC if Indicated  - Electrocardiogram Perform and Read    Given persistent elevated readings she will start metoprolol XL 25 mg daily given that she has had palpitations  Continue to work on diet and exercise  Follow-up to recheck blood pressure    2. Low back pain  Multilevel degenerative changes and advanced facet degeneration  Degenerative disc disease and lumbar disc protrusion    Continue to follow plan of Dr. Sipple  She has been treated with tizanidine and tramadol    Patient agrees to plan      Subjective:       Jenifer Johnson is a 63 y.o. female who presents to the clinic as she has been experiencing elevated blood pressure readings recently.  She has had multiple readings at other clinics and at home that have been elevated.  There are times where she can feel like her heart is pounding.  She denies headache or visual changes.  She has never been diagnosed with hypertension previously.  She denies chest pain, shortness of breath, or orthopnea.  She does not take a lot of anti-inflammatory medication.  There is a strong family history of hypertension including both her mother and father.  She is a former smoker.    Her medical history is otherwise notable for chronic neck and low back pain.  She continues to follow-up with Dr. Sipple for care of her spine.  She has a history of multilevel degenerative changes which is quite severe.  She had an MRI of the lumbar spine that showed advanced facet degeneration and moderate disc degeneration.  She has central disc protrusions noted.  She is treated with tizanidine and will take a tramadol for breakthrough pain as prescribed by Dr. Sipple.    The following portions of the  "patient's history were reviewed and updated as appropriate: allergies, current medications, past family history, past medical history, past social history, past surgical history and problem list.    Review of Systems   A 12 point comprehensive review of systems was negative except as noted.      Current Outpatient Prescriptions   Medication Sig Dispense Refill     BIOTIN ORAL Take by mouth daily.       calcium carb and citrate-vitD3 600 mg calcium- 500 unit TbER Take by mouth daily.       cholecalciferol, vitamin D3, 1,000 unit tablet Take 1,000 Units by mouth daily.       COQ10, UBIQUINOL, ORAL Take by mouth.       ibuprofen (ADVIL,MOTRIN) 200 MG tablet Take 800 mg by mouth 2 (two) times a day.       multivitamin (ONE A DAY) per tablet Take 1 tablet by mouth daily.       tiZANidine (ZANAFLEX) 2 MG tablet Take 2 mg by mouth 2 (two) times a day.  1     TIZANIDINE HCL (TIZANIDINE ORAL) as needed.       traMADol (ULTRAM) 50 mg tablet TK 1 TO 2 TS PO Q 4 H PRN  1     tretinoin (RETIN-A) 0.025 % gel YENI TO FACE QHS  2     metoprolol succinate (TOPROL XL) 25 MG Take 1 tablet (25 mg total) by mouth daily. 90 tablet 1     No current facility-administered medications for this visit.        Objective:      /84  Pulse 68  Temp 97.8  F (36.6  C) (Oral)   Resp 16  Ht 5' 3.5\" (1.613 m)  Wt 120 lb (54.4 kg)  BMI 20.92 kg/m2      General appearance: alert, appears stated age and cooperative  Head: Normocephalic, without obvious abnormality, atraumatic  Eyes: conjunctivae/corneas clear. PERRL, EOM's intact.   Nose: Nares normal. Septum midline. Mucosa normal  Lungs: clear to auscultation bilaterally  Heart: regular rate and rhythm, S1, S2 normal, no murmur, click, rub or gallop  Extremities: extremities normal, atraumatic, no cyanosis or edema  Skin: Skin color, texture, turgor normal. No rashes or lesions  Lymph nodes: Cervical nodes normal.  Neurologic: Alert and oriented X 3         Recent Results (from the past 168 " hour(s))   Electrocardiogram Perform and Read   Result Value Ref Range    SYSTOLIC BLOOD PRESSURE  mmHg    DIASTOLIC BLOOD PRESSURE  mmHg    VENTRICULAR RATE 63 BPM    ATRIAL RATE 63 BPM    P-R INTERVAL 162 ms    QRS DURATION 86 ms    Q-T INTERVAL 408 ms    QTC CALCULATION (BEZET) 417 ms    P Axis 59 degrees    R AXIS 14 degrees    T AXIS 64 degrees    MUSE DIAGNOSIS       Normal sinus rhythm  Normal ECG  No previous ECGs available  Confirmed by CAREY HUYNH MD LOC: ([id number]) on 6/7/2018 4:49:38 PM     Basic Metabolic Panel   Result Value Ref Range    Sodium 142 136 - 145 mmol/L    Potassium 4.6 3.5 - 5.0 mmol/L    Chloride 103 98 - 107 mmol/L    CO2 28 22 - 31 mmol/L    Anion Gap, Calculation 11 5 - 18 mmol/L    Glucose 86 70 - 125 mg/dL    Calcium 10.1 8.5 - 10.5 mg/dL    BUN 16 8 - 22 mg/dL    Creatinine 0.99 0.60 - 1.10 mg/dL    GFR MDRD Af Amer >60 >60 mL/min/1.73m2    GFR MDRD Non Af Amer 57 (L) >60 mL/min/1.73m2   HM2(CBC w/o Differential)   Result Value Ref Range    WBC 5.9 4.0 - 11.0 thou/uL    RBC 4.36 3.80 - 5.40 mill/uL    Hemoglobin 14.0 12.0 - 16.0 g/dL    Hematocrit 41.8 35.0 - 47.0 %    MCV 96 80 - 100 fL    MCH 32.2 27.0 - 34.0 pg    MCHC 33.5 32.0 - 36.0 g/dL    RDW 11.0 11.0 - 14.5 %    Platelets 202 140 - 440 thou/uL    MPV 7.6 7.0 - 10.0 fL   Urinalysis-UC if Indicated   Result Value Ref Range    Color, UA Yellow Colorless, Yellow, Straw, Light Yellow    Clarity, UA Clear Clear    Glucose, UA Negative Negative    Bilirubin, UA Negative Negative    Ketones, UA Negative Negative    Specific Gravity, UA 1.015 1.005 - 1.030    Blood, UA Negative Negative    pH, UA 7.0 5.0 - 8.0    Protein, UA Negative Negative mg/dL    Urobilinogen, UA 0.2 E.U./dL 0.2 E.U./dL, 1.0 E.U./dL    Nitrite, UA Negative Negative    Leukocytes, UA Negative Negative          This note has been dictated using voice recognition software. Any grammatical or context distortions are unintentional and inherent to the software

## 2021-06-22 NOTE — PROGRESS NOTES
Assessment/ Plan     1. Low back pain, unspecified back pain laterality, unspecified chronicity, with sciatica presence unspecified    Continue plan per her spine specialist and pain clinic  She has been taking tramadol as prescribed elsewhere  Recommend conservative therapy with stretches and exercises    2. Post-menopausal    Refer for bone density test  - DXA Bone Density Scan; Future    3.  Loose stools/diarrhea    May have been secondary to medication adjustments    Recommend increased fluids and increased fiber including Metamucil  Recommend fluid hydration  If having persistent symptoms consider checking stool cultures    4.  Hypertension, currently stable    Continue to monitor blood pressure  Follow-up as advised      Subjective:       Jenifer Johnson is a 63 y.o. female who presents to review of a few concerns.  She has a history of chronic low back pain as well as hypertension.  The last visit she was started on Metroprolol for blood pressure.  Her blood pressure is currently stable today.  As noted previously she has a history of chronic neck and low back pain.  She has followed up with Dr. Sipple given a history of multilevel degenerative changes which have been quite severe.  She had an MRI of the lumbar spine that showed advanced facet degeneration and moderate disc degeneration.  She has had central disc protrusions as well.  She has been treated with tizanidine as well as tramadol.  She reports that she has had some medication changes recently and has had some diarrhea or loose stools which she characterizes as being due to withdrawal of her medications.  She continues to follow-up with her pain clinic regarding this.  She denies fever or chills.  She denies dark tarry stools or passage of bright blood per rectum.  She would like to continue gabapentin for her low back pain.    The following portions of the patient's history were reviewed and updated as appropriate: allergies, current  medications, past family history, past medical history, past social history, past surgical history and problem list. Medications have been reconciled    Review of Systems   A 12 point comprehensive review of systems was negative except as noted.      Current Outpatient Medications   Medication Sig Dispense Refill     BIOTIN ORAL Take by mouth daily.       calcium carb and citrate-vitD3 600 mg calcium- 500 unit TbER Take by mouth daily.       cholecalciferol, vitamin D3, 1,000 unit tablet Take 1,000 Units by mouth daily.       COQ10, UBIQUINOL, ORAL Take by mouth.       ibuprofen (ADVIL,MOTRIN) 200 MG tablet Take 800 mg by mouth 2 (two) times a day.       multivitamin (ONE A DAY) per tablet Take 1 tablet by mouth daily.       NON FORMULARY CARDITONE  1 and half capsules daily .       tiZANidine (ZANAFLEX) 2 MG tablet Take 2 mg by mouth 2 (two) times a day.  1     TIZANIDINE HCL (TIZANIDINE ORAL) as needed.       traMADol (ULTRAM) 50 mg tablet TK 1 TO 2 TS PO Q 4 H PRN  1     tretinoin (RETIN-A) 0.025 % gel YENI TO FACE QHS  2     gabapentin (NEURONTIN) 100 MG capsule Take 100 mg by mouth 3 (three) times a day. 90 capsule 2     No current facility-administered medications for this visit.        Objective:      /66   Pulse 64   Temp 98.6  F (37  C) (Oral)   Wt 129 lb (58.5 kg)   BMI 22.49 kg/m        General appearance: alert, appears stated age and cooperative  Head: Normocephalic, without obvious abnormality, atraumatic  Eyes: conjunctivae/corneas clear. PERRL, EOM's intact.   Nose: Nares normal. Septum midline. Mucosa normal. No drainage or sinus tenderness.  Throat: lips, mucosa, and tongue normal; teeth and gums normal  Neck: no adenopathy, supple, symmetrical, trachea midline and thyroid not enlarged, symmetric, no tenderness/mass/nodules  Lungs: clear to auscultation bilaterally  Heart: regular rate and rhythm, S1, S2 normal, no murmur, click, rub or gallop  Abdomen: soft, non-tender  Extremities:  extremities normal, atraumatic, no cyanosis or edema  Skin: Skin color, texture, turgor normal. No rashes or lesions  Lymph nodes: Cervical nodes normal.  Neurologic: Alert and oriented X 3         No results found for this or any previous visit (from the past 168 hour(s)).       This note has been dictated using voice recognition software. Any grammatical or context distortions are unintentional and inherent to the software

## 2021-06-30 ENCOUNTER — RECORDS - HEALTHEAST (OUTPATIENT)
Dept: ADMINISTRATIVE | Facility: OTHER | Age: 66
End: 2021-06-30

## 2021-08-02 ENCOUNTER — TRANSFERRED RECORDS (OUTPATIENT)
Dept: HEALTH INFORMATION MANAGEMENT | Facility: CLINIC | Age: 66
End: 2021-08-02

## 2021-09-07 ENCOUNTER — TRANSFERRED RECORDS (OUTPATIENT)
Dept: HEALTH INFORMATION MANAGEMENT | Facility: CLINIC | Age: 66
End: 2021-09-07

## 2021-09-19 ENCOUNTER — HEALTH MAINTENANCE LETTER (OUTPATIENT)
Age: 66
End: 2021-09-19

## 2021-10-07 ENCOUNTER — TRANSFERRED RECORDS (OUTPATIENT)
Dept: HEALTH INFORMATION MANAGEMENT | Facility: CLINIC | Age: 66
End: 2021-10-07

## 2022-01-04 ENCOUNTER — TRANSFERRED RECORDS (OUTPATIENT)
Dept: HEALTH INFORMATION MANAGEMENT | Facility: CLINIC | Age: 67
End: 2022-01-04
Payer: COMMERCIAL

## 2022-02-02 ENCOUNTER — TRANSFERRED RECORDS (OUTPATIENT)
Dept: HEALTH INFORMATION MANAGEMENT | Facility: CLINIC | Age: 67
End: 2022-02-02
Payer: COMMERCIAL

## 2022-03-04 ENCOUNTER — TRANSFERRED RECORDS (OUTPATIENT)
Dept: HEALTH INFORMATION MANAGEMENT | Facility: CLINIC | Age: 67
End: 2022-03-04
Payer: COMMERCIAL

## 2022-03-23 ENCOUNTER — TELEPHONE (OUTPATIENT)
Dept: FAMILY MEDICINE | Facility: CLINIC | Age: 67
End: 2022-03-23
Payer: COMMERCIAL

## 2022-03-23 DIAGNOSIS — G89.29 CHRONIC LOW BACK PAIN, UNSPECIFIED BACK PAIN LATERALITY, UNSPECIFIED WHETHER SCIATICA PRESENT: Primary | ICD-10-CM

## 2022-03-23 DIAGNOSIS — M54.50 CHRONIC LOW BACK PAIN, UNSPECIFIED BACK PAIN LATERALITY, UNSPECIFIED WHETHER SCIATICA PRESENT: Primary | ICD-10-CM

## 2022-03-23 DIAGNOSIS — M25.50 MULTIPLE JOINT PAIN: ICD-10-CM

## 2022-03-23 NOTE — TELEPHONE ENCOUNTER
Reason for Call:  Other call back    Detailed comments: Jenifer would like a referral sent over for the pain clinic in [place] ([facility])    Phone Number Patient can be reached at: Home number on file [phone] (home)    Best Time: today anytime and tomorrow afternoon    Can we leave a detailed message on this number? YES    Call taken on 3/23/2022 at 3:33 PM by Carole Cagle

## 2022-04-07 ENCOUNTER — TRANSFERRED RECORDS (OUTPATIENT)
Dept: HEALTH INFORMATION MANAGEMENT | Facility: CLINIC | Age: 67
End: 2022-04-07
Payer: COMMERCIAL

## 2022-05-05 ENCOUNTER — TRANSFERRED RECORDS (OUTPATIENT)
Dept: HEALTH INFORMATION MANAGEMENT | Facility: CLINIC | Age: 67
End: 2022-05-05
Payer: COMMERCIAL

## 2022-06-08 ENCOUNTER — TRANSFERRED RECORDS (OUTPATIENT)
Dept: HEALTH INFORMATION MANAGEMENT | Facility: CLINIC | Age: 67
End: 2022-06-08
Payer: COMMERCIAL

## 2022-06-26 ENCOUNTER — HEALTH MAINTENANCE LETTER (OUTPATIENT)
Age: 67
End: 2022-06-26

## 2022-07-06 ENCOUNTER — TRANSFERRED RECORDS (OUTPATIENT)
Dept: HEALTH INFORMATION MANAGEMENT | Facility: CLINIC | Age: 67
End: 2022-07-06

## 2022-08-09 ENCOUNTER — TRANSFERRED RECORDS (OUTPATIENT)
Dept: HEALTH INFORMATION MANAGEMENT | Facility: CLINIC | Age: 67
End: 2022-08-09

## 2022-09-09 ENCOUNTER — TRANSFERRED RECORDS (OUTPATIENT)
Dept: HEALTH INFORMATION MANAGEMENT | Facility: CLINIC | Age: 67
End: 2022-09-09

## 2022-10-04 ENCOUNTER — TRANSFERRED RECORDS (OUTPATIENT)
Dept: HEALTH INFORMATION MANAGEMENT | Facility: CLINIC | Age: 67
End: 2022-10-04

## 2022-11-08 ENCOUNTER — TRANSFERRED RECORDS (OUTPATIENT)
Dept: HEALTH INFORMATION MANAGEMENT | Facility: CLINIC | Age: 67
End: 2022-11-08

## 2022-11-20 ENCOUNTER — HEALTH MAINTENANCE LETTER (OUTPATIENT)
Age: 67
End: 2022-11-20

## 2022-12-08 ENCOUNTER — TRANSFERRED RECORDS (OUTPATIENT)
Dept: HEALTH INFORMATION MANAGEMENT | Facility: CLINIC | Age: 67
End: 2022-12-08

## 2023-01-06 ENCOUNTER — TRANSFERRED RECORDS (OUTPATIENT)
Dept: HEALTH INFORMATION MANAGEMENT | Facility: CLINIC | Age: 68
End: 2023-01-06

## 2023-02-06 ENCOUNTER — TRANSFERRED RECORDS (OUTPATIENT)
Dept: HEALTH INFORMATION MANAGEMENT | Facility: CLINIC | Age: 68
End: 2023-02-06

## 2023-03-08 ENCOUNTER — TRANSFERRED RECORDS (OUTPATIENT)
Dept: HEALTH INFORMATION MANAGEMENT | Facility: CLINIC | Age: 68
End: 2023-03-08

## 2023-03-15 ENCOUNTER — TRANSFERRED RECORDS (OUTPATIENT)
Dept: HEALTH INFORMATION MANAGEMENT | Facility: CLINIC | Age: 68
End: 2023-03-15

## 2023-04-18 ENCOUNTER — OFFICE VISIT (OUTPATIENT)
Dept: FAMILY MEDICINE | Facility: CLINIC | Age: 68
End: 2023-04-18
Payer: COMMERCIAL

## 2023-04-18 VITALS
SYSTOLIC BLOOD PRESSURE: 155 MMHG | HEART RATE: 71 BPM | RESPIRATION RATE: 14 BRPM | BODY MASS INDEX: 20.22 KG/M2 | TEMPERATURE: 99 F | WEIGHT: 118.4 LBS | DIASTOLIC BLOOD PRESSURE: 89 MMHG | HEIGHT: 64 IN | OXYGEN SATURATION: 97 %

## 2023-04-18 DIAGNOSIS — R10.32 LLQ ABDOMINAL PAIN: Primary | ICD-10-CM

## 2023-04-18 DIAGNOSIS — I10 PRIMARY HYPERTENSION: ICD-10-CM

## 2023-04-18 DIAGNOSIS — N20.0 NEPHROLITHIASIS: ICD-10-CM

## 2023-04-18 LAB
ALBUMIN SERPL BCG-MCNC: 4.4 G/DL (ref 3.5–5.2)
ALBUMIN UR-MCNC: NEGATIVE MG/DL
ALP SERPL-CCNC: 104 U/L (ref 35–104)
ALT SERPL W P-5'-P-CCNC: 24 U/L (ref 10–35)
ANION GAP SERPL CALCULATED.3IONS-SCNC: 15 MMOL/L (ref 7–15)
APPEARANCE UR: CLEAR
AST SERPL W P-5'-P-CCNC: 22 U/L (ref 10–35)
BACTERIA #/AREA URNS HPF: ABNORMAL /HPF
BASOPHILS # BLD AUTO: 0 10E3/UL (ref 0–0.2)
BASOPHILS NFR BLD AUTO: 0 %
BILIRUB SERPL-MCNC: 0.4 MG/DL
BILIRUB UR QL STRIP: NEGATIVE
BUN SERPL-MCNC: 16.5 MG/DL (ref 8–23)
CALCIUM SERPL-MCNC: 10.1 MG/DL (ref 8.8–10.2)
CHLORIDE SERPL-SCNC: 99 MMOL/L (ref 98–107)
COLOR UR AUTO: YELLOW
CREAT SERPL-MCNC: 0.9 MG/DL (ref 0.51–0.95)
DEPRECATED HCO3 PLAS-SCNC: 26 MMOL/L (ref 22–29)
EOSINOPHIL # BLD AUTO: 0.1 10E3/UL (ref 0–0.7)
EOSINOPHIL NFR BLD AUTO: 1 %
ERYTHROCYTE [DISTWIDTH] IN BLOOD BY AUTOMATED COUNT: 12.3 % (ref 10–15)
GFR SERPL CREATININE-BSD FRML MDRD: 70 ML/MIN/1.73M2
GLUCOSE SERPL-MCNC: 100 MG/DL (ref 70–99)
GLUCOSE UR STRIP-MCNC: NEGATIVE MG/DL
HCT VFR BLD AUTO: 43.6 % (ref 35–47)
HGB BLD-MCNC: 14.7 G/DL (ref 11.7–15.7)
HGB UR QL STRIP: ABNORMAL
IMM GRANULOCYTES # BLD: 0 10E3/UL
IMM GRANULOCYTES NFR BLD: 0 %
KETONES UR STRIP-MCNC: NEGATIVE MG/DL
LEUKOCYTE ESTERASE UR QL STRIP: ABNORMAL
LIPASE SERPL-CCNC: 27 U/L (ref 13–60)
LYMPHOCYTES # BLD AUTO: 2.4 10E3/UL (ref 0.8–5.3)
LYMPHOCYTES NFR BLD AUTO: 25 %
MCH RBC QN AUTO: 32 PG (ref 26.5–33)
MCHC RBC AUTO-ENTMCNC: 33.7 G/DL (ref 31.5–36.5)
MCV RBC AUTO: 95 FL (ref 78–100)
MONOCYTES # BLD AUTO: 0.7 10E3/UL (ref 0–1.3)
MONOCYTES NFR BLD AUTO: 7 %
NEUTROPHILS # BLD AUTO: 6.5 10E3/UL (ref 1.6–8.3)
NEUTROPHILS NFR BLD AUTO: 67 %
NITRATE UR QL: NEGATIVE
PH UR STRIP: 7 [PH] (ref 5–8)
PLATELET # BLD AUTO: 259 10E3/UL (ref 150–450)
POTASSIUM SERPL-SCNC: 3.9 MMOL/L (ref 3.4–5.3)
PROT SERPL-MCNC: 6.9 G/DL (ref 6.4–8.3)
RBC # BLD AUTO: 4.59 10E6/UL (ref 3.8–5.2)
RBC #/AREA URNS AUTO: ABNORMAL /HPF
SODIUM SERPL-SCNC: 140 MMOL/L (ref 136–145)
SP GR UR STRIP: 1.01 (ref 1–1.03)
SQUAMOUS #/AREA URNS AUTO: ABNORMAL /LPF
UROBILINOGEN UR STRIP-ACNC: 0.2 E.U./DL
WBC # BLD AUTO: 9.7 10E3/UL (ref 4–11)
WBC #/AREA URNS AUTO: ABNORMAL /HPF

## 2023-04-18 PROCEDURE — 80053 COMPREHEN METABOLIC PANEL: CPT | Performed by: FAMILY MEDICINE

## 2023-04-18 PROCEDURE — 36415 COLL VENOUS BLD VENIPUNCTURE: CPT | Performed by: FAMILY MEDICINE

## 2023-04-18 PROCEDURE — 81001 URINALYSIS AUTO W/SCOPE: CPT | Performed by: FAMILY MEDICINE

## 2023-04-18 PROCEDURE — 83690 ASSAY OF LIPASE: CPT | Performed by: FAMILY MEDICINE

## 2023-04-18 PROCEDURE — 99204 OFFICE O/P NEW MOD 45 MIN: CPT | Performed by: FAMILY MEDICINE

## 2023-04-18 PROCEDURE — 85025 COMPLETE CBC W/AUTO DIFF WBC: CPT | Performed by: FAMILY MEDICINE

## 2023-04-18 RX ORDER — GABAPENTIN 100 MG/1
CAPSULE ORAL
COMMUNITY

## 2023-04-18 ASSESSMENT — PATIENT HEALTH QUESTIONNAIRE - PHQ9
10. IF YOU CHECKED OFF ANY PROBLEMS, HOW DIFFICULT HAVE THESE PROBLEMS MADE IT FOR YOU TO DO YOUR WORK, TAKE CARE OF THINGS AT HOME, OR GET ALONG WITH OTHER PEOPLE: SOMEWHAT DIFFICULT
SUM OF ALL RESPONSES TO PHQ QUESTIONS 1-9: 11
SUM OF ALL RESPONSES TO PHQ QUESTIONS 1-9: 11

## 2023-04-18 NOTE — PATIENT INSTRUCTIONS
Jenifer,    We will check a urine test and laboratory testing today  We will also obtain your most recent colonoscopy result  Set up the CT scan for further evaluation  We can review when those results are available  I recommend that you continue with Tylenol  If your pain should become severe you may present to the emergency department    George Brantley MD

## 2023-04-18 NOTE — PROGRESS NOTES
Assessment & Plan     LLQ abdominal pain  Nephrolithiasis    Consider possible acute diverticulitis versus kidney stone versus other etiology    Check laboratory testing including urinalysis and laboratory testing as noted  Given the level of discomfort will refer her for a CT scan for further evaluation    Addendum:    Patient was referred for an urgent CT scan and this showed left-sided hydronephrosis with a 2 mm stone at the uteropelvic junction  We will place a stat consult with the kidney stone clinic/urology    - Comprehensive metabolic panel  - UA Macroscopic with reflex to Microscopic and Culture  - Lipase  - CBC with Platelets & Differential  - Comprehensive metabolic panel  - UA Macroscopic with reflex to Microscopic and Culture  - Lipase  - CBC with Platelets & Differential  - UA Microscopic with Reflex to Culture  - CT Abdomen Pelvis w Contrast  - Adult Urology  Referral        - Adult Urology  Referral    Primary hypertension    Blood pressure is elevated today though she is in discomfort due to her kidney stone  Recommend monitoring blood pressure and consider treatment as warranted      Review of external notes as documented elsewhere in note         Depression Screening Follow Up        4/18/2023     1:19 PM   PHQ   PHQ-9 Total Score 11   Q9: Thoughts of better off dead/self-harm past 2 weeks Not at all         4/18/2023     1:19 PM   Last PHQ-9   1.  Little interest or pleasure in doing things 2   2.  Feeling down, depressed, or hopeless 2   3.  Trouble falling or staying asleep, or sleeping too much 2   4.  Feeling tired or having little energy 2   5.  Poor appetite or overeating 1   6.  Feeling bad about yourself 1   7.  Trouble concentrating 1   8.  Moving slowly or restless 0   Q9: Thoughts of better off dead/self-harm past 2 weeks 0   PHQ-9 Total Score 11       Follow Up Actions Taken  Crisis resource information provided in After Visit Summary  Referred patient back to PCP          George Brantley MD  [facility]    Genoveva Burgess is a 68 year old, presenting for the following health issues:  Abdominal Pain (C/o  bad intestinal pain )    This is a pleasant 68-year-old female who presents to the clinic with her .  Her main concern today has been abdominal pain that started 2 days ago.  She states that she had done some yard work and later felt nauseated and somewhat shaky.  She has developed pain that is more prominent in the left lower quadrant of her abdomen.  This radiates up to the left mid back area.  She feels bloated.  The pain has increased.  She denies obvious fever or chills.  She has not had burning with urination or blood in her urine.  She denies diarrhea or constipation currently.    She is up-to-date on her colonoscopy which will be obtained.  This was reportedly normal.    Her medical history is notable for hypertension, breast cancer, chronic low back pain, and osteoporosis.     Her medical history is notable for breast cancer diagnosed in 2007.  She underwent a left breast mastectomy.  Her most recent mammogram was in October of this year.     Additionally, she has a history of chronic neck and low back pain followed by a spine specialist She has known low back pain and multilevel lumbar degenerative changes.  There is evidence of spondylolisthesis and advanced facet degeneration moderate disc degeneration.  She has paracentral disc protrusions noted including L4-L5.  She has been treated with gabapentin and tizanidine as well as tramadol.  .             4/18/2023     1:34 PM   Additional Questions   Roomed by Sylwia WILHELM CMA   Accompanied by spouse     History of Present Illness       Reason for visit:  Abdominal pain  Symptom onset:  1-2 weeks ago  Symptoms include:  Pain bloating fullness  Symptom intensity:  Severe  Symptom progression:  Worsening  Had these symptoms before:  No  What makes it worse:  Eating sitting  "upright  What makes it better:  Lying down reclining tylenol    She eats 2-3 servings of fruits and vegetables daily.She consumes 3 sweetened beverage(s) daily.She exercises with enough effort to increase her heart rate 9 or less minutes per day.  She exercises with enough effort to increase her heart rate 3 or less days per week.   She is taking medications regularly.    Today's PHQ-9         PHQ-9 Total Score: 11    PHQ-9 Q9 Thoughts of better off dead/self-harm past 2 weeks :   Not at all    How difficult have these problems made it for you to do your work, take care of things at home, or get along with other people: Somewhat difficult               Review of Systems   Constitutional, HEENT, cardiovascular, pulmonary, GI, , musculoskeletal, neuro, skin, endocrine and psych systems are negative, except as otherwise noted.      Objective    BP (!) 155/89 (BP Location: Right arm, Patient Position: Sitting, Cuff Size: Adult Regular)   Pulse 71   Temp 99  F (37.2  C) (Oral)   Resp 14   Ht 1.613 m (5' 3.5\")   Wt 53.7 kg (118 lb 6.4 oz)   LMP  (LMP Unknown)   SpO2 97%   BMI 20.64 kg/m    Body mass index is 20.64 kg/m .  Physical Exam   GENERAL: healthy, alert and no distress  NECK: no adenopathy, no asymmetry, masses, or scars and thyroid normal to palpation  RESP: lungs clear to auscultation - no rales, rhonchi or wheezes  CV: regular rate and rhythm, normal S1 S2, no S3 or S4, no murmur, click or rub, no peripheral edema and peripheral pulses strong  ABDOMEN: Soft, mildly tender left lower quadrant without rebound tenderness or guarding  There is some CVA tenderness  MS: no gross musculoskeletal defects noted, no edema  SKIN: no suspicious lesions or rashes  NEURO: Normal strength and tone, mentation intact and speech normal  PSYCH: mentation appears normal, affect normal/bright                    "

## 2023-04-19 ENCOUNTER — HOSPITAL ENCOUNTER (OUTPATIENT)
Dept: CT IMAGING | Facility: HOSPITAL | Age: 68
Discharge: HOME | End: 2023-04-19
Attending: FAMILY MEDICINE | Admitting: FAMILY MEDICINE
Payer: COMMERCIAL

## 2023-04-19 DIAGNOSIS — R10.32 LLQ ABDOMINAL PAIN: ICD-10-CM

## 2023-04-19 PROCEDURE — 250N000011 HC RX IP 250 OP 636: Performed by: FAMILY MEDICINE

## 2023-04-19 PROCEDURE — 74177 CT ABD & PELVIS W/CONTRAST: CPT

## 2023-04-19 RX ORDER — IOPAMIDOL 755 MG/ML
100 INJECTION, SOLUTION INTRAVASCULAR ONCE
Status: COMPLETED | OUTPATIENT
Start: 2023-04-19 | End: 2023-04-19

## 2023-04-19 RX ADMIN — IOPAMIDOL 100 ML: 755 INJECTION, SOLUTION INTRAVENOUS at 15:46

## 2023-04-21 ENCOUNTER — VIRTUAL VISIT (OUTPATIENT)
Dept: UROLOGY | Facility: CLINIC | Age: 68
End: 2023-04-21
Payer: COMMERCIAL

## 2023-04-21 DIAGNOSIS — N20.0 NEPHROLITHIASIS: ICD-10-CM

## 2023-04-21 DIAGNOSIS — R10.32 LLQ ABDOMINAL PAIN: ICD-10-CM

## 2023-04-21 PROCEDURE — 99204 OFFICE O/P NEW MOD 45 MIN: CPT | Mod: GT | Performed by: UROLOGY

## 2023-04-21 RX ORDER — TAMSULOSIN HYDROCHLORIDE 0.4 MG/1
0.4 CAPSULE ORAL DAILY
Qty: 30 CAPSULE | Refills: 1 | Status: SHIPPED | OUTPATIENT
Start: 2023-04-21

## 2023-04-21 RX ORDER — DIMENHYDRINATE 50 MG
25 TABLET ORAL 2 TIMES DAILY PRN
Qty: 20 TABLET | Refills: 1 | Status: SHIPPED | OUTPATIENT
Start: 2023-04-21

## 2023-04-21 RX ORDER — KETOROLAC TROMETHAMINE 10 MG/1
10 TABLET, FILM COATED ORAL EVERY 6 HOURS PRN
Qty: 40 TABLET | Refills: 0 | Status: SHIPPED | OUTPATIENT
Start: 2023-04-21

## 2023-04-21 RX ORDER — OXYCODONE HYDROCHLORIDE 5 MG/1
5 TABLET ORAL EVERY 6 HOURS PRN
Qty: 12 TABLET | Refills: 0 | Status: SHIPPED | OUTPATIENT
Start: 2023-04-21 | End: 2023-04-24

## 2023-04-21 NOTE — PROGRESS NOTES
Name: Jenifer Johnson   MRN: [id number]  YOB: 1955    Assessment and Plan:  68 year old female with left renal colic and a 4 x 6 mm left proximal ureteral stone.     1. Left renal colic  2. Left ureteral stone    Discussed trial of passage for the left ureteral stone.  This involves controlling pain while we allow the ureteral stone to hopefully pass on its own.  Discussed off-label use of tamsulosin in order to facilitate stone passage and help improve pain associated with stone passage.  Also discussed use of ibuprofen 600 mg every 6 hours and acetaminophen 1000 mg every 6 hours for pain.  Can use dimenhydrinate 25 mg every 6 hours as needed as well.  Provided a limited dose of oxycodone for breakthrough pain despite these measures.    Patient will strain their urine in the interim.  If a stone is collected, will bring it in for analysis.    Will follow up with her on Monday to see if she has adequate pain control for trial of passage or needs intervention sooner.    If in 4 weeks:  -catches stone and pain resolved, no further action is needed.  Will bring in stone for evaluation.  -has no pain but does not catch a stone, will get imaging (CT) to assess for stone location/passage.  -continues to have pain without stone passage, will proceed with stone treatment (ESWL or URS)      Grayson Walker MD  April 21, 2023         Chief Complaint: left flank pain and renal stone    History of Present Illness:  Jenifer Johnson is a 68 year old female seen in consultation from Dr. Brantley for discussion of Left proximal ureteral stone.      The current episode was first found due to work up for abdominal pain starting last week.  Has some nausea and pain.  No hematuria or dysuria.  CT abd/pel with contrast performed on 4/18/2023 (images personally reviewed) demonstrated:  Right Kidney: no stones or hydronephrosis  Left Kidney: 4 x 6 mm left proximal ureteral stone with upstream  hydronephrosis    Currently, they are experiencing severe pain peaking at a 9-10/10.  Currently taking tylenol.  Comes in waves.  Having associated nausea at night, no hematuria, or no dysuria.  They have not experienced recent stone passage.     Pertinent stone history:  -- Personal stone history  -- Prior stone procedure  -- Prior stone analysis  -- Prior metabolic evaluation  + Family stone history    Pertinent stone medical history  -- Diabetes  -- Neurologic disease limiting mobility   + Osteoporosis  -- Gout  -- Gastric bypass surgery  -- Sarcoidosis  -- Inflammatory bowel disease  -- History of non-stone  surgery     Pertinent medications:  -- Antiplatelet  -- Anticoagulant  -- Topiramate   -- Thiazaide  -- Potassium supplement      I reviewed internal labs, of which pertinent ones include:   Hemoglobin   Date Value Ref Range Status   04/18/2023 14.7 11.7 - 15.7 g/dL Final   11/25/2003 14.1 11.7 - 15.7 g/dL Final     Potassium   Date Value Ref Range Status   04/18/2023 3.9 3.4 - 5.3 mmol/L Final   11/26/2019 4.1 3.5 - 5.0 mmol/L Final   11/25/2003 3.6 3.4 - 5.3 mmol/L Final     Creatinine   Date Value Ref Range Status   04/18/2023 0.90 0.51 - 0.95 mg/dL Final   11/25/2003 0.9 0.6 - 1.3 mg/dL Final     pH Urine   Date Value Ref Range Status   04/18/2023 7.0 5.0 - 8.0 Final   11/25/2003 6.0 5.0 - 7.0 pH Final       I reviewed internal records which in summarized above.         Past Medical History:  Past Medical History:   Diagnosis Date     Arthritis      H/O left mastectomy 11/01/2007     Hypertension             Past Surgical History:  Past Surgical History:   Procedure Laterality Date     BREAST SURGERY       MASTECTOMY       [place] INJECTION,SACROILIAC JOINT N/A 1/21/2019    Procedure: RIGHT HIP INJECTION;  Surgeon: Sipple, Daniel Peter, DO;  Location: [facility];  Service: Pain            Social History:  Social History     Tobacco Use     Smoking status: Former     Types: Cigarettes     Quit date:  2007     Years since quittin.3     Smokeless tobacco: Never   Substance Use Topics     Alcohol use: Yes     Comment: Alcoholic Drinks/day: rarely     Drug use: No            Family History:  Family History   Problem Relation Age of Onset     Hypertension Mother      Hyperlipidemia Father      Hypertension Father             Allergies:  Allergies   Allergen Reactions     Alendronate Sodium [Alendronic Acid] Nausea     Abdominal pain and body pain in the muscles and joints.     Citalopram Unknown and Nausea     Abnormal vivid dreams.     Duloxetine Unknown, Dizziness and Nausea     Forteo [Teriparatide] Nausea            Medications:  Current Outpatient Medications   Medication Sig     budesonide (ENTOCORT EC) 3 mg 24 hr capsule [BUDESONIDE (ENTOCORT EC) 3 MG 24 HR CAPSULE] Take 9 mg by mouth every morning.     calcium carb and citrate-vitD3 600 mg calcium- 500 unit TbER [CALCIUM CARB AND CITRATE-VITD3 600 MG CALCIUM- 500 UNIT TBER] Take by mouth daily.     cholecalciferol, vitamin D3, 1,000 unit tablet [CHOLECALCIFEROL, VITAMIN D3, 1,000 UNIT TABLET] Take 1,000 Units by mouth daily.     Collagen-Vitamin C-Biotin (COLLAGEN 1500/C PO)      COQ10, UBIQUINOL, ORAL [COQ10, UBIQUINOL, ORAL] Take by mouth.     gabapentin (NEURONTIN) 100 MG capsule gabapentin 100 mg capsule   TAKE 1 CAPSULE BY MOUTH EVERY DAY IN THE EVENING     multivitamin (ONE A DAY) per tablet [MULTIVITAMIN (ONE A DAY) PER TABLET] Take 1 tablet by mouth daily.     NON FORMULARY [NON FORMULARY] CARDITONE  1 and half capsules daily .     tiZANidine (ZANAFLEX) 2 MG tablet [TIZANIDINE (ZANAFLEX) 2 MG TABLET] Take 2 mg by mouth 2 (two) times a day.     traMADol (ULTRAM) 50 mg tablet [TRAMADOL (ULTRAM) 50 MG TABLET] TK 1 TO 2 TS PO Q 4 H PRN     tretinoin (RETIN-A) 0.025 % gel [TRETINOIN (RETIN-A) 0.025 % GEL] YENI TO FACE QHS     No current facility-administered medications for this visit.       Review of Systems:   ROS: 10 point ROS neg other than the  "symptoms noted above in the HPI.    Physical Exam:  B/P: Data Unavailable, T: Data Unavailable, P: Data Unavailable, R: Data Unavailable  Estimated body mass index is 20.64 kg/m  as calculated from the following:    Height as of 4/18/23: 1.613 m (5' 3.5\").    Weight as of 4/18/23: 53.7 kg (118 lb 6.4 oz).  General Appearance Adult: Alert, no acute distress, oriented  Lungs: no respiratory distress, or pursed lip breathing  Neuro: Alert, oriented, speech and mentation normal  Psych: affect and mood normal    Outside records:   I spent 0 minutes reviewing outside records.      Virtual Visit Details    Type of service:  Video Visit   Video Start Time: 11:05 AM  Video End Time:11:22 AM    Originating Location (pt. Location): Home    Distant Location (provider location):  On-site  Platform used for Video Visit: Unable to complete video visit  "

## 2023-04-21 NOTE — NURSING NOTE
Pt stated that previous communication with clinic she was told that this would be a phone visit. Pt would like provider to call home phone [phone]. I did mention that new pts often like to be seen, pt states if video is needed provider may send [employer] link to pts cell [phone]      Is the patient currently in the state of [place]? YES    Visit mode:VIDEO    If the visit is dropped, the patient can be reconnected by: VIDEO VISIT: Text to cell phone: [phone]    Will anyone else be joining the visit? NO      How would you like to obtain your AVS? MyChart    Are changes needed to the allergy or medication list? NO    Reason for visit: Video Visit (New stone consult )

## 2023-04-21 NOTE — Clinical Note
Thanks for referring Ms. Johnson.  I wrote her some additional meds for renal colic management and hoping if this keeps things under control she can try to pass the stone.  If not, we will schedule her for a procedure.  Thanks, Grayson Walker

## 2023-04-24 ENCOUNTER — PATIENT OUTREACH (OUTPATIENT)
Dept: UROLOGY | Facility: CLINIC | Age: 68
End: 2023-04-24
Payer: COMMERCIAL

## 2023-04-24 NOTE — PROGRESS NOTES
RNCC called and spoke to patient      Reports being much improved from pain and using her otc pain meds and narcotic pain med    Pt has increased water, eating as tolerated and taking the flomax    She will start straining for the stone, but advised to her that may take time    Pt will follow up with us in 4 weeks if stone not passed    Pt Agreeable to plan and verbalized understanding      [facility]  [phone]

## 2023-04-26 ENCOUNTER — PATIENT OUTREACH (OUTPATIENT)
Dept: UROLOGY | Facility: CLINIC | Age: 68
End: 2023-04-26
Payer: COMMERCIAL

## 2023-04-26 NOTE — PROGRESS NOTES
RNCC returned patient call from today about dark colored urine    LM with direct call back number      [facility]  [phone]

## 2023-05-05 ENCOUNTER — TRANSFERRED RECORDS (OUTPATIENT)
Dept: HEALTH INFORMATION MANAGEMENT | Facility: CLINIC | Age: 68
End: 2023-05-05
Payer: COMMERCIAL

## 2023-05-09 ENCOUNTER — TRANSFERRED RECORDS (OUTPATIENT)
Dept: HEALTH INFORMATION MANAGEMENT | Facility: CLINIC | Age: 68
End: 2023-05-09
Payer: COMMERCIAL

## 2023-05-13 DIAGNOSIS — N20.0 NEPHROLITHIASIS: ICD-10-CM

## 2023-05-26 ENCOUNTER — PATIENT OUTREACH (OUTPATIENT)
Dept: UROLOGY | Facility: CLINIC | Age: 68
End: 2023-05-26
Payer: COMMERCIAL

## 2023-05-26 NOTE — PROGRESS NOTES
RNCC called pt to follow up on stone pain    Pt reports all pain and sx resolved and no longer taking the flomax or the pain medication    Pt reports she may have passed it due to sx relief but not able to catch the stone.  She is actively straining for it.    Pt will update us if her situation changes    Pt verbalized understanding and agreeable to plan    RNCC sent update to provider for further advisement if change to plan      [facility]  [phone]

## 2023-06-07 ENCOUNTER — TRANSFERRED RECORDS (OUTPATIENT)
Dept: HEALTH INFORMATION MANAGEMENT | Facility: CLINIC | Age: 68
End: 2023-06-07
Payer: COMMERCIAL

## 2023-06-16 ENCOUNTER — TRANSFERRED RECORDS (OUTPATIENT)
Dept: HEALTH INFORMATION MANAGEMENT | Facility: CLINIC | Age: 68
End: 2023-06-16
Payer: COMMERCIAL

## 2023-07-08 ENCOUNTER — HEALTH MAINTENANCE LETTER (OUTPATIENT)
Age: 68
End: 2023-07-08

## 2023-08-11 RX ORDER — TAMSULOSIN HYDROCHLORIDE 0.4 MG/1
CAPSULE ORAL
Qty: 30 CAPSULE | Refills: 1 | OUTPATIENT
Start: 2023-08-11

## 2023-10-09 ENCOUNTER — TRANSFERRED RECORDS (OUTPATIENT)
Dept: HEALTH INFORMATION MANAGEMENT | Facility: CLINIC | Age: 68
End: 2023-10-09
Payer: COMMERCIAL

## 2023-11-06 ENCOUNTER — TRANSFERRED RECORDS (OUTPATIENT)
Dept: HEALTH INFORMATION MANAGEMENT | Facility: CLINIC | Age: 68
End: 2023-11-06
Payer: COMMERCIAL

## 2023-12-01 DIAGNOSIS — N20.0 NEPHROLITHIASIS: Primary | ICD-10-CM

## 2023-12-04 ENCOUNTER — TRANSFERRED RECORDS (OUTPATIENT)
Dept: HEALTH INFORMATION MANAGEMENT | Facility: CLINIC | Age: 68
End: 2023-12-04
Payer: COMMERCIAL

## 2023-12-06 ENCOUNTER — TELEPHONE (OUTPATIENT)
Dept: UROLOGY | Facility: CLINIC | Age: 68
End: 2023-12-06
Payer: COMMERCIAL

## 2023-12-06 NOTE — TELEPHONE ENCOUNTER
We will have our scheduling team reach out to the patient. She can set up the Renal US at her own convenience.    Lacy Colon RN on 12/6/2023 at 9:56 AM

## 2023-12-06 NOTE — TELEPHONE ENCOUNTER
Patient was last seen by Dr. Walker on 4/21/23 for a 4/6 mm left ureteral stone and colic.    Per 5/26 patient outreach note, patient states all sx's resolved; no longer taking Flomax or pain meds.    Renal US ordered on 12/1. They are requesting patient follow up with [facility] in [place] since the pt lives in [place], although last visit with Dr. Walker was virtual.    IF patient does have stones, we would not be able to do any procedures or surgery so not sure this is the best option.    Please advise.    Lacy Colon RN on 12/6/2023 at 9:29 AM

## 2023-12-06 NOTE — TELEPHONE ENCOUNTER
Jeane Rodriguez PA-C  You8 minutes ago (9:45 AM)     EM  If it's just a regular follow up that's fine. She can be scheduled with me after her renal US.

## 2023-12-06 NOTE — TELEPHONE ENCOUNTER
[facility] Call Center    Phone Message    May a detailed message be left on voicemail: yes     Reason for Call: Other: Pt returning call, refusing to make an apt and stating the office must have gotten the wrong Jenifer Johnson. Please advise and return phone call      Action Taken: Message routed to:  Other: uro    Travel Screening: Not Applicable

## 2024-01-09 ENCOUNTER — TRANSFERRED RECORDS (OUTPATIENT)
Dept: HEALTH INFORMATION MANAGEMENT | Facility: CLINIC | Age: 69
End: 2024-01-09
Payer: COMMERCIAL

## 2024-02-06 ENCOUNTER — TRANSFERRED RECORDS (OUTPATIENT)
Dept: HEALTH INFORMATION MANAGEMENT | Facility: CLINIC | Age: 69
End: 2024-02-06
Payer: COMMERCIAL

## 2024-02-08 ENCOUNTER — TRANSFERRED RECORDS (OUTPATIENT)
Dept: HEALTH INFORMATION MANAGEMENT | Facility: CLINIC | Age: 69
End: 2024-02-08
Payer: COMMERCIAL

## 2024-08-31 ENCOUNTER — HEALTH MAINTENANCE LETTER (OUTPATIENT)
Age: 69
End: 2024-08-31

## 2024-11-05 ENCOUNTER — TRANSFERRED RECORDS (OUTPATIENT)
Dept: HEALTH INFORMATION MANAGEMENT | Facility: CLINIC | Age: 69
End: 2024-11-05
Payer: COMMERCIAL

## 2024-12-05 ENCOUNTER — TRANSFERRED RECORDS (OUTPATIENT)
Dept: HEALTH INFORMATION MANAGEMENT | Facility: CLINIC | Age: 69
End: 2024-12-05
Payer: COMMERCIAL

## 2025-01-08 ENCOUNTER — TRANSFERRED RECORDS (OUTPATIENT)
Dept: HEALTH INFORMATION MANAGEMENT | Facility: CLINIC | Age: 70
End: 2025-01-08
Payer: COMMERCIAL

## 2025-01-15 ENCOUNTER — OFFICE VISIT (OUTPATIENT)
Dept: FAMILY MEDICINE | Facility: CLINIC | Age: 70
End: 2025-01-15
Payer: COMMERCIAL

## 2025-01-15 VITALS
SYSTOLIC BLOOD PRESSURE: 154 MMHG | TEMPERATURE: 99.1 F | HEART RATE: 75 BPM | DIASTOLIC BLOOD PRESSURE: 81 MMHG | OXYGEN SATURATION: 96 % | WEIGHT: 116.8 LBS | BODY MASS INDEX: 19.94 KG/M2 | RESPIRATION RATE: 14 BRPM | HEIGHT: 64 IN

## 2025-01-15 DIAGNOSIS — Z85.3 PERSONAL HISTORY OF MALIGNANT NEOPLASM OF BREAST: ICD-10-CM

## 2025-01-15 DIAGNOSIS — G89.29 CHRONIC MIDLINE LOW BACK PAIN WITHOUT SCIATICA: ICD-10-CM

## 2025-01-15 DIAGNOSIS — Z13.6 ENCOUNTER FOR LIPID SCREENING FOR CARDIOVASCULAR DISEASE: ICD-10-CM

## 2025-01-15 DIAGNOSIS — K52.9 CHRONIC DIARRHEA: Primary | ICD-10-CM

## 2025-01-15 DIAGNOSIS — R93.89 ABNORMAL FINDINGS ON DIAGNOSTIC IMAGING OF OTHER SPECIFIED BODY STRUCTURES: ICD-10-CM

## 2025-01-15 DIAGNOSIS — R79.9 ABNORMAL FINDING OF BLOOD CHEMISTRY, UNSPECIFIED: ICD-10-CM

## 2025-01-15 DIAGNOSIS — M54.50 CHRONIC MIDLINE LOW BACK PAIN WITHOUT SCIATICA: ICD-10-CM

## 2025-01-15 DIAGNOSIS — R00.2 PALPITATIONS: ICD-10-CM

## 2025-01-15 DIAGNOSIS — M54.2 NECK PAIN: ICD-10-CM

## 2025-01-15 DIAGNOSIS — Z13.220 ENCOUNTER FOR LIPID SCREENING FOR CARDIOVASCULAR DISEASE: ICD-10-CM

## 2025-01-15 DIAGNOSIS — R63.4 WEIGHT LOSS: ICD-10-CM

## 2025-01-15 PROBLEM — I10 ESSENTIAL HYPERTENSION: Status: ACTIVE | Noted: 2018-08-07

## 2025-01-15 LAB
ERYTHROCYTE [DISTWIDTH] IN BLOOD BY AUTOMATED COUNT: 12.6 % (ref 10–15)
EST. AVERAGE GLUCOSE BLD GHB EST-MCNC: 105 MG/DL
HBA1C MFR BLD: 5.3 % (ref 0–5.6)
HCT VFR BLD AUTO: 41.8 % (ref 35–47)
HGB BLD-MCNC: 14.1 G/DL (ref 11.7–15.7)
MCH RBC QN AUTO: 31.4 PG (ref 26.5–33)
MCHC RBC AUTO-ENTMCNC: 33.7 G/DL (ref 31.5–36.5)
MCV RBC AUTO: 93 FL (ref 78–100)
PLATELET # BLD AUTO: 224 10E3/UL (ref 150–450)
RBC # BLD AUTO: 4.49 10E6/UL (ref 3.8–5.2)
WBC # BLD AUTO: 5.3 10E3/UL (ref 4–11)

## 2025-01-15 PROCEDURE — 99214 OFFICE O/P EST MOD 30 MIN: CPT | Performed by: FAMILY MEDICINE

## 2025-01-15 PROCEDURE — 82607 VITAMIN B-12: CPT | Performed by: FAMILY MEDICINE

## 2025-01-15 PROCEDURE — 83036 HEMOGLOBIN GLYCOSYLATED A1C: CPT | Performed by: FAMILY MEDICINE

## 2025-01-15 PROCEDURE — 84443 ASSAY THYROID STIM HORMONE: CPT | Performed by: FAMILY MEDICINE

## 2025-01-15 PROCEDURE — 36415 COLL VENOUS BLD VENIPUNCTURE: CPT | Performed by: FAMILY MEDICINE

## 2025-01-15 PROCEDURE — 85027 COMPLETE CBC AUTOMATED: CPT | Performed by: FAMILY MEDICINE

## 2025-01-15 PROCEDURE — 80053 COMPREHEN METABOLIC PANEL: CPT | Performed by: FAMILY MEDICINE

## 2025-01-15 PROCEDURE — 80061 LIPID PANEL: CPT | Performed by: FAMILY MEDICINE

## 2025-01-15 RX ORDER — GABAPENTIN 300 MG/1
CAPSULE ORAL
Status: SHIPPED
Start: 2025-01-15

## 2025-01-15 NOTE — PATIENT INSTRUCTIONS
Jenifer,    We will check your laboratory testing as discussed  Continue to monitor your blood pressure.  Ideally, your blood pressure would be less than 130/85  We will obtain your colonoscopy report  I recommend that you follow-up with [facility] as the next step  You can consider seeing Gypsy Madrigal as a provider  Please follow-up if you have any ongoing symptoms of concern  Remember adequate calcium 1200 mg plus vitamin D 1,000 units daily    George Brantley MD

## 2025-01-15 NOTE — PROGRESS NOTES
"  {PROVIDER CHARTING PREFERENCE:918813}    Genoveva Burgess is a 69 year old, presenting for the following health issues:  Weight Loss (Unintentional weight loss )        1/15/2025    11:02 AM   Additional Questions   Roomed by Sylwia WILHELM CMA     History of Present Illness       Reason for visit:  Weight loss, heart racing, (possible anxiety?)  Symptom onset:  More than a month  Symptoms include:  See above  Symptom intensity:  Moderate  Symptom progression:  Staying the same  Had these symptoms before:  No  What makes it worse:  Sometimes heart issue comes on during exertion, but not always  What makes it better:  Seems to go away on its own, not due to anything I do.   She is taking medications regularly.       {MA/LPN/RN Pre-Provider Visit Orders- hCG/UA/Strep (Optional):573877}  {SUPERLIST (Optional):294391}  {additonal problems for provider to add (Optional):718361}    {ROS Picklists (Optional):280700}      Objective    BP (!) 154/81 (BP Location: Right arm, Patient Position: Sitting, Cuff Size: Adult Regular)   Pulse 75   Temp 99.1  F (37.3  C) (Oral)   Resp 14   Ht 1.613 m (5' 3.5\")   Wt 53 kg (116 lb 12.8 oz)   LMP  (LMP Unknown)   SpO2 96%   BMI 20.37 kg/m    Body mass index is 20.37 kg/m .  Physical Exam   {Exam List (Optional):357235}    {Diagnostic Test Results (Optional):267056}        Signed Electronically by: George Brantley MD  {Email feedback regarding this note to [email or web address]   :981559}  " Loss (Unintentional weight loss )        1/15/2025    11:02 AM   Additional Questions   Roomed by Sylwia WILHELM CMA     History of Present Illness       Reason for visit:  Weight loss, heart racing, (possible anxiety?)  Symptom onset:  More than a month  Symptoms include:  See above  Symptom intensity:  Moderate  Symptom progression:  Staying the same  Had these symptoms before:  No  What makes it worse:  Sometimes heart issue comes on during exertion, but not always  What makes it better:  Seems to go away on its own, not due to anything I do.   She is taking medications regularly.     Jenifer presents to the clinic for multiple medical concerns.    A primary concern has been weight loss.  In reviewing the chart her weight was 118 pounds 2 years ago and she is 116 pounds today.  Her appetite has generally been stable.    At a previous visit she was seen for abdominal and flank pain and had a kidney stone at the time.    Her medical history is notable for hypertension, breast cancer, chronic low back pain, and osteoporosis.     Her medical history is notable for breast cancer diagnosed in 2007.  She underwent a left breast mastectomy.      She has a history of chronic diarrhea.  She continues to have loose stools.  She followed up with [facility] and saw Dr. Newby in 2020.  Celiac disease was essentially ruled out with an upper endoscopy.  She was advised to adopt a FODMAP diet.  She was referred for a colonoscopy at that time and those results will be obtained.  She denies dark and tarry stools or passage of bright red blood per rectum.     Additionally, she has a history of chronic neck and low back pain followed by a spine specialist She has known low back pain and multilevel lumbar degenerative changes.  There is evidence of spondylolisthesis and advanced facet degeneration moderate disc degeneration.  She has paracentral disc protrusions noted including L4-L5.  She has been treated with gabapentin and  "tizanidine as well as tramadol.  She is followed by the [facility].     Review systems otherwise notable for neck pain as well as palpitations.                    Objective    BP (!) 154/81 (BP Location: Right arm, Patient Position: Sitting, Cuff Size: Adult Regular)   Pulse 75   Temp 99.1  F (37.3  C) (Oral)   Resp 14   Ht 1.613 m (5' 3.5\")   Wt 53 kg (116 lb 12.8 oz)   LMP  (LMP Unknown)   SpO2 96%   BMI 20.37 kg/m    Body mass index is 20.37 kg/m .  Physical Exam   GENERAL: alert and no distress  EYES: Eyes grossly normal to inspection, PERRL and conjunctivae and sclerae normal  RESP: lungs clear to auscultation - no rales, rhonchi or wheezes  CV: regular rate and rhythm, normal S1 S2, no S3 or S4, no murmur, click or rub, no peripheral edema  MS: no gross musculoskeletal defects noted, no edema  PSYCH: mentation appears normal, affect normal/bright            Signed Electronically by: George Brantley MD    "

## 2025-01-16 LAB
ALBUMIN SERPL BCG-MCNC: 4.5 G/DL (ref 3.5–5.2)
ALP SERPL-CCNC: 97 U/L (ref 40–150)
ALT SERPL W P-5'-P-CCNC: 39 U/L (ref 0–50)
ANION GAP SERPL CALCULATED.3IONS-SCNC: 10 MMOL/L (ref 7–15)
AST SERPL W P-5'-P-CCNC: 35 U/L (ref 0–45)
BILIRUB SERPL-MCNC: 0.5 MG/DL
BUN SERPL-MCNC: 17.6 MG/DL (ref 8–23)
CALCIUM SERPL-MCNC: 9.6 MG/DL (ref 8.8–10.4)
CHLORIDE SERPL-SCNC: 102 MMOL/L (ref 98–107)
CHOLEST SERPL-MCNC: 215 MG/DL
CREAT SERPL-MCNC: 1.11 MG/DL (ref 0.51–0.95)
EGFRCR SERPLBLD CKD-EPI 2021: 54 ML/MIN/1.73M2
FASTING STATUS PATIENT QL REPORTED: YES
FASTING STATUS PATIENT QL REPORTED: YES
GLUCOSE SERPL-MCNC: 90 MG/DL (ref 70–99)
HCO3 SERPL-SCNC: 29 MMOL/L (ref 22–29)
HDLC SERPL-MCNC: 68 MG/DL
LDLC SERPL CALC-MCNC: 111 MG/DL
NONHDLC SERPL-MCNC: 147 MG/DL
POTASSIUM SERPL-SCNC: 3.9 MMOL/L (ref 3.4–5.3)
PROT SERPL-MCNC: 6.7 G/DL (ref 6.4–8.3)
SODIUM SERPL-SCNC: 141 MMOL/L (ref 135–145)
TRIGL SERPL-MCNC: 179 MG/DL
TSH SERPL DL<=0.005 MIU/L-ACNC: 2.2 UIU/ML (ref 0.3–4.2)
VIT B12 SERPL-MCNC: 803 PG/ML (ref 232–1245)

## 2025-02-04 ENCOUNTER — TRANSFERRED RECORDS (OUTPATIENT)
Dept: HEALTH INFORMATION MANAGEMENT | Facility: CLINIC | Age: 70
End: 2025-02-04
Payer: COMMERCIAL

## 2025-02-10 ENCOUNTER — TRANSFERRED RECORDS (OUTPATIENT)
Dept: HEALTH INFORMATION MANAGEMENT | Facility: CLINIC | Age: 70
End: 2025-02-10
Payer: COMMERCIAL

## 2025-02-12 ENCOUNTER — TRANSFERRED RECORDS (OUTPATIENT)
Dept: HEALTH INFORMATION MANAGEMENT | Facility: CLINIC | Age: 70
End: 2025-02-12
Payer: COMMERCIAL

## 2025-03-10 ENCOUNTER — TRANSFERRED RECORDS (OUTPATIENT)
Dept: HEALTH INFORMATION MANAGEMENT | Facility: CLINIC | Age: 70
End: 2025-03-10
Payer: COMMERCIAL

## 2025-04-09 ENCOUNTER — TRANSFERRED RECORDS (OUTPATIENT)
Dept: HEALTH INFORMATION MANAGEMENT | Facility: CLINIC | Age: 70
End: 2025-04-09
Payer: COMMERCIAL

## 2025-05-07 ENCOUNTER — TRANSFERRED RECORDS (OUTPATIENT)
Dept: HEALTH INFORMATION MANAGEMENT | Facility: CLINIC | Age: 70
End: 2025-05-07
Payer: COMMERCIAL

## 2025-06-10 ENCOUNTER — TRANSFERRED RECORDS (OUTPATIENT)
Dept: HEALTH INFORMATION MANAGEMENT | Facility: CLINIC | Age: 70
End: 2025-06-10
Payer: COMMERCIAL

## 2025-06-19 ENCOUNTER — TELEPHONE (OUTPATIENT)
Dept: FAMILY MEDICINE | Facility: CLINIC | Age: 70
End: 2025-06-19
Payer: COMMERCIAL

## 2025-06-19 NOTE — TELEPHONE ENCOUNTER
Patient Quality Outreach    Patient is due for the following:   Physical Annual Wellness Visit    Action(s) Taken:   Schedule a Annual Wellness Visit    Type of outreach:    Sent [facility] message.    Questions for provider review:    None         Carmela Clinton MA  Chart routed to None.

## 2025-07-29 ENCOUNTER — ANCILLARY PROCEDURE (OUTPATIENT)
Dept: BONE DENSITY | Facility: CLINIC | Age: 70
End: 2025-07-29
Attending: FAMILY MEDICINE
Payer: COMMERCIAL

## 2025-07-29 DIAGNOSIS — Z78.0 POST-MENOPAUSAL: ICD-10-CM

## 2025-07-29 PROCEDURE — 77080 DXA BONE DENSITY AXIAL: CPT | Mod: TC | Performed by: PHYSICIAN ASSISTANT

## 2025-08-11 ENCOUNTER — TRANSFERRED RECORDS (OUTPATIENT)
Dept: HEALTH INFORMATION MANAGEMENT | Facility: CLINIC | Age: 70
End: 2025-08-11
Payer: COMMERCIAL

## 2025-08-25 ENCOUNTER — PATIENT OUTREACH (OUTPATIENT)
Dept: CARE COORDINATION | Facility: CLINIC | Age: 70
End: 2025-08-25
Payer: COMMERCIAL